# Patient Record
Sex: FEMALE | Race: WHITE | HISPANIC OR LATINO
[De-identification: names, ages, dates, MRNs, and addresses within clinical notes are randomized per-mention and may not be internally consistent; named-entity substitution may affect disease eponyms.]

---

## 2017-05-17 PROBLEM — Z00.00 ENCOUNTER FOR PREVENTIVE HEALTH EXAMINATION: Status: ACTIVE | Noted: 2017-05-17

## 2017-05-23 ENCOUNTER — APPOINTMENT (OUTPATIENT)
Dept: CARDIOLOGY | Facility: CLINIC | Age: 47
End: 2017-05-23

## 2017-05-23 ENCOUNTER — OUTPATIENT (OUTPATIENT)
Dept: OUTPATIENT SERVICES | Facility: HOSPITAL | Age: 47
LOS: 1 days | End: 2017-05-23
Payer: COMMERCIAL

## 2017-05-23 VITALS
RESPIRATION RATE: 16 BRPM | WEIGHT: 160 LBS | HEIGHT: 63 IN | BODY MASS INDEX: 28.35 KG/M2 | SYSTOLIC BLOOD PRESSURE: 127 MMHG | DIASTOLIC BLOOD PRESSURE: 84 MMHG | OXYGEN SATURATION: 98 % | HEART RATE: 68 BPM

## 2017-05-23 DIAGNOSIS — Z83.49 FAMILY HISTORY OF OTHER ENDOCRINE, NUTRITIONAL AND METABOLIC DISEASES: ICD-10-CM

## 2017-05-23 DIAGNOSIS — Z00.8 ENCOUNTER FOR OTHER GENERAL EXAMINATION: ICD-10-CM

## 2017-05-23 DIAGNOSIS — Z86.79 PERSONAL HISTORY OF OTHER DISEASES OF THE CIRCULATORY SYSTEM: ICD-10-CM

## 2017-05-23 DIAGNOSIS — Z83.3 FAMILY HISTORY OF DIABETES MELLITUS: ICD-10-CM

## 2017-05-23 PROCEDURE — G0463: CPT | Mod: 25

## 2017-05-23 PROCEDURE — 93005 ELECTROCARDIOGRAM TRACING: CPT

## 2017-05-31 DIAGNOSIS — R06.09 OTHER FORMS OF DYSPNEA: ICD-10-CM

## 2017-06-02 ENCOUNTER — APPOINTMENT (OUTPATIENT)
Dept: ULTRASOUND IMAGING | Facility: HOSPITAL | Age: 47
End: 2017-06-02

## 2017-06-08 ENCOUNTER — OUTPATIENT (OUTPATIENT)
Dept: OUTPATIENT SERVICES | Facility: HOSPITAL | Age: 47
LOS: 1 days | End: 2017-06-08
Payer: COMMERCIAL

## 2017-06-08 ENCOUNTER — APPOINTMENT (OUTPATIENT)
Dept: CARDIOLOGY | Facility: CLINIC | Age: 47
End: 2017-06-08

## 2017-06-08 DIAGNOSIS — Z00.8 ENCOUNTER FOR OTHER GENERAL EXAMINATION: ICD-10-CM

## 2017-06-08 PROCEDURE — 93306 TTE W/DOPPLER COMPLETE: CPT | Mod: 26

## 2017-06-08 PROCEDURE — 93306 TTE W/DOPPLER COMPLETE: CPT

## 2017-06-13 ENCOUNTER — APPOINTMENT (OUTPATIENT)
Dept: CARDIOLOGY | Facility: CLINIC | Age: 47
End: 2017-06-13

## 2017-06-13 ENCOUNTER — OUTPATIENT (OUTPATIENT)
Dept: OUTPATIENT SERVICES | Facility: HOSPITAL | Age: 47
LOS: 1 days | End: 2017-06-13
Payer: COMMERCIAL

## 2017-06-13 DIAGNOSIS — Z00.8 ENCOUNTER FOR OTHER GENERAL EXAMINATION: ICD-10-CM

## 2017-06-13 PROCEDURE — 93018 CV STRESS TEST I&R ONLY: CPT

## 2017-06-13 PROCEDURE — 93016 CV STRESS TEST SUPVJ ONLY: CPT

## 2017-06-13 PROCEDURE — 93017 CV STRESS TEST TRACING ONLY: CPT

## 2017-06-27 ENCOUNTER — OUTPATIENT (OUTPATIENT)
Dept: OUTPATIENT SERVICES | Facility: HOSPITAL | Age: 47
LOS: 1 days | End: 2017-06-27
Payer: COMMERCIAL

## 2017-06-27 PROCEDURE — 76856 US EXAM PELVIC COMPLETE: CPT

## 2017-06-27 PROCEDURE — 72100 X-RAY EXAM L-S SPINE 2/3 VWS: CPT | Mod: 26

## 2017-06-27 PROCEDURE — 76830 TRANSVAGINAL US NON-OB: CPT | Mod: 26

## 2017-06-27 PROCEDURE — 73502 X-RAY EXAM HIP UNI 2-3 VIEWS: CPT

## 2017-06-27 PROCEDURE — 76700 US EXAM ABDOM COMPLETE: CPT

## 2017-06-27 PROCEDURE — 76856 US EXAM PELVIC COMPLETE: CPT | Mod: 26

## 2017-06-27 PROCEDURE — 76700 US EXAM ABDOM COMPLETE: CPT | Mod: 26

## 2017-06-27 PROCEDURE — 76830 TRANSVAGINAL US NON-OB: CPT

## 2017-06-27 PROCEDURE — 72100 X-RAY EXAM L-S SPINE 2/3 VWS: CPT

## 2017-06-27 PROCEDURE — 73502 X-RAY EXAM HIP UNI 2-3 VIEWS: CPT | Mod: 26,RT

## 2017-07-11 ENCOUNTER — APPOINTMENT (OUTPATIENT)
Dept: CARDIOLOGY | Facility: CLINIC | Age: 47
End: 2017-07-11

## 2021-01-16 ENCOUNTER — EMERGENCY (EMERGENCY)
Facility: HOSPITAL | Age: 51
LOS: 1 days | Discharge: HOME | End: 2021-01-16
Admitting: EMERGENCY MEDICINE
Payer: COMMERCIAL

## 2021-01-16 VITALS
OXYGEN SATURATION: 99 % | DIASTOLIC BLOOD PRESSURE: 85 MMHG | SYSTOLIC BLOOD PRESSURE: 132 MMHG | HEART RATE: 76 BPM | RESPIRATION RATE: 17 BRPM | TEMPERATURE: 98 F

## 2021-01-16 DIAGNOSIS — M79.674 PAIN IN RIGHT TOE(S): ICD-10-CM

## 2021-01-16 DIAGNOSIS — L08.9 LOCAL INFECTION OF THE SKIN AND SUBCUTANEOUS TISSUE, UNSPECIFIED: ICD-10-CM

## 2021-01-16 DIAGNOSIS — E78.00 PURE HYPERCHOLESTEROLEMIA, UNSPECIFIED: ICD-10-CM

## 2021-01-16 DIAGNOSIS — I10 ESSENTIAL (PRIMARY) HYPERTENSION: ICD-10-CM

## 2021-01-16 DIAGNOSIS — L53.9 ERYTHEMATOUS CONDITION, UNSPECIFIED: ICD-10-CM

## 2021-01-16 PROCEDURE — 99283 EMERGENCY DEPT VISIT LOW MDM: CPT

## 2021-01-16 RX ORDER — CEPHALEXIN 500 MG
1 CAPSULE ORAL
Qty: 30 | Refills: 0
Start: 2021-01-16 | End: 2021-01-25

## 2021-01-16 RX ORDER — CEPHALEXIN 500 MG
500 CAPSULE ORAL ONCE
Refills: 0 | Status: COMPLETED | OUTPATIENT
Start: 2021-01-16 | End: 2021-01-16

## 2021-01-16 RX ORDER — BISOPROLOL FUMARATE 10 MG/1
1 TABLET, FILM COATED ORAL
Qty: 0 | Refills: 0 | DISCHARGE

## 2021-01-16 RX ADMIN — Medication 1 TABLET(S): at 21:31

## 2021-01-16 RX ADMIN — Medication 500 MILLIGRAM(S): at 21:31

## 2021-01-16 NOTE — ED PROVIDER NOTE - OBJECTIVE STATEMENT
51 y/o F with PMHx HTN presents to the ED c/o R foot 1st toe pain, swelling, and redness that started earlier today. Pt reports she had a pedicure done today and typically uses an acrylic nail on the 1st toe due to a prior injury that resulted in the pt being unable to grow a nail on the toe. States the pain and swelling started after the pedicure, and she decided to cut the acrylic nail off and cleaned the area with hydrogen peroxide. Denies any fever, trauma, purulent drainage to the area, or numbness or tingling. 51 y/o F with PMHx HTN presents to the ED c/o R foot 1st toe pain, swelling, and redness that started earlier today. Pt reports she had a pedicure done today and typically uses an acrylic nail on the 1st toe due to a prior injury that resulted in the pt  unable to grow a nail on the toe. States the pain and swelling started after the pedicure, and she decided to cut the acrylic nail off and cleaned the area with hydrogen peroxide. Denies any fever, trauma, purulent drainage to the area, or numbness or tingling.

## 2021-01-16 NOTE — ED PROVIDER NOTE - CLINICAL SUMMARY MEDICAL DECISION MAKING FREE TEXT BOX
Patient with right great toe cuticle infection. No collection with no lymphangitis. Afebrile and well appearing pt. Recommend daily wound care, abx therapy and f/u with podiatry.

## 2021-01-16 NOTE — ED PROVIDER NOTE - MUSCULOSKELETAL, MLM
Swollen R great toe, erythematous, no collection, distal pulses intact, no lymphangitis, FROM of the toe and extremity w/ no motor or sensory deficits.

## 2021-01-16 NOTE — ED PROVIDER NOTE - NSFOLLOWUPINSTRUCTIONS_ED_ALL_ED_FT
ACUTE WOUNDS - AfterCare(R) Instructions(ER/ED)           Acute Wounds    WHAT YOU NEED TO KNOW:    An acute wound is an injury that causes a break in the skin. As your wound begins to heal, it is normal to have some swelling, pain, and redness. Your body's immune system is working to keep your wound from getting infected. Your wound may develop a scab. The scab protects your wound as it heals.     DISCHARGE INSTRUCTIONS:    Call 911 for the following:   •You suddenly have trouble breathing or have chest pain.          Return to the emergency department if:   •Blood soaks through your bandage.      •You have pus or a foul odor coming from the wound.      •Your stitches come apart or your wound reopens.      Contact your healthcare provider if:   •You continue to have pain even after you have taken pain medicine.      •You have muscle, joint, or body aches, sweating, or a fever.      •You have increased swelling, redness, or bleeding in your wound.      •Your skin is itchy, swollen, or you have a rash.      •You have questions or concerns about your condition or care.      Medicines: You may need any of the following:   •Antibiotics may be given to prevent or treat an infection.       •Prescription pain medicine may be given. Ask your how to take this medicine safely.      •NSAIDs, such as ibuprofen, help decrease swelling, pain, and fever. This medicine is available with or without a doctor's order. NSAIDs can cause stomach bleeding or kidney problems in certain people. If you take blood thinner medicine, always ask if NSAIDs are safe for you. Always read the medicine label and follow directions. Do not give these medicines to children under 6 months of age without direction from your child's healthcare provider.      •Take your medicine as directed. Contact your healthcare provider if you think your medicine is not helping or if you have side effects. Tell him or her if you are allergic to any medicine. Keep a list of the medicines, vitamins, and herbs you take. Include the amounts, and when and why you take them. Bring the list or the pill bottles to follow-up visits. Carry your medicine list with you in case of an emergency.      Care for your wound as directed: Acute wounds can be in different locations and caused by different injuries. Follow your healthcare provider's instructions on caring for your type of wound. The following care items are for most wounds:  •Keep your wound covered with a clean and dry bandage. Change your bandage if it becomes wet or dirty. This will decrease the risk for infection in your wound. Follow your healthcare provider's instructions for changing your dressing.       •Do not soak in a tub or swim until your healthcare provider says it is okay. Your wound may open if you get it too wet. Dirt from the water can also get into your wound and cause an infection.      •Keep pets away from your wound. Pets carry germs that can cause a wound infection.       •Do not pick or scratch scabs. Let scabs fall off on their own. You may damage new skin that is forming under the scab. You may have a worse scar after the damage.      •Eat healthy foods and drink liquids as directed. Healthy foods give your body the nutrients it needs to heal your wound. Liquids prevent dehydration that can decrease the blood supply to your wound. Healthy foods include fruits, vegetables, grains (breads and cereals), dairy, and protein foods. Protein foods include meat, fish, nuts, and soy products. Protein, calories, vitamin C, and zinc help wounds heal. Ask your healthcare provider for more information about the foods you should eat to improve healing.       Follow up with your healthcare provider as directed: Write down your questions so you remember to ask them during your visits.        © Copyright [employer] 2021           back to top                          © Copyright [employer] 2021

## 2021-01-16 NOTE — ED PROVIDER NOTE - PATIENT PORTAL LINK FT
You can access the FollowMyHealth Patient Portal offered by [facility] by registering at the following website: [email or web address]. By joining [employer]’s FollowMyHealth portal, you will also be able to view your health information using other applications (apps) compatible with our system.

## 2021-01-16 NOTE — ED PROVIDER NOTE - NSFOLLOWUPCLINICS_GEN_ALL_ED_FT
[facility]  Podiatry  [address] [place] [address]  Phone: [phone]  Fax:   Follow Up Time:

## 2021-01-16 NOTE — ED ADULT NURSE NOTE - OBJECTIVE STATEMENT
50y Female reporting 1st right Toe pain since this morning. Redness and swelling noted to the area without drainage. Per pt "I had an acrylic nail placed at the salon and picked it off when it was hurting". Denies cp, fever, chills, sob, nor cp.

## 2021-02-17 NOTE — ED PROVIDER NOTE - TOBACCO USE
Additional Notes: Patient consent was obtained to proceed with the visit and recommended plan of care after discussion of all risks and benefits, including the risks of COVID-19 exposure.
Detail Level: Simple
Unknown if ever smoked

## 2023-07-16 ENCOUNTER — EMERGENCY (EMERGENCY)
Facility: HOSPITAL | Age: 53
LOS: 1 days | Discharge: HOME | End: 2023-07-16
Attending: STUDENT IN AN ORGANIZED HEALTH CARE EDUCATION/TRAINING PROGRAM | Admitting: STUDENT IN AN ORGANIZED HEALTH CARE EDUCATION/TRAINING PROGRAM
Payer: COMMERCIAL

## 2023-07-16 VITALS
HEART RATE: 75 BPM | SYSTOLIC BLOOD PRESSURE: 120 MMHG | HEIGHT: 62 IN | OXYGEN SATURATION: 98 % | TEMPERATURE: 99 F | WEIGHT: 156.97 LBS | RESPIRATION RATE: 20 BRPM | DIASTOLIC BLOOD PRESSURE: 78 MMHG

## 2023-07-16 VITALS
DIASTOLIC BLOOD PRESSURE: 75 MMHG | HEART RATE: 65 BPM | OXYGEN SATURATION: 97 % | RESPIRATION RATE: 20 BRPM | TEMPERATURE: 98 F | SYSTOLIC BLOOD PRESSURE: 106 MMHG

## 2023-07-16 DIAGNOSIS — Z20.822 CONTACT WITH AND (SUSPECTED) EXPOSURE TO COVID-19: ICD-10-CM

## 2023-07-16 DIAGNOSIS — B97.89 OTHER VIRAL AGENTS AS THE CAUSE OF DISEASES CLASSIFIED ELSEWHERE: ICD-10-CM

## 2023-07-16 DIAGNOSIS — J06.9 ACUTE UPPER RESPIRATORY INFECTION, UNSPECIFIED: ICD-10-CM

## 2023-07-16 DIAGNOSIS — B97.10 UNSPECIFIED ENTEROVIRUS AS THE CAUSE OF DISEASES CLASSIFIED ELSEWHERE: ICD-10-CM

## 2023-07-16 DIAGNOSIS — R05.1 ACUTE COUGH: ICD-10-CM

## 2023-07-16 DIAGNOSIS — Z91.018 ALLERGY TO OTHER FOODS: ICD-10-CM

## 2023-07-16 DIAGNOSIS — Z86.16 PERSONAL HISTORY OF COVID-19: ICD-10-CM

## 2023-07-16 DIAGNOSIS — Z91.013 ALLERGY TO SEAFOOD: ICD-10-CM

## 2023-07-16 DIAGNOSIS — Z88.8 ALLERGY STATUS TO OTHER DRUGS, MEDICAMENTS AND BIOLOGICAL SUBSTANCES: ICD-10-CM

## 2023-07-16 PROBLEM — E78.00 PURE HYPERCHOLESTEROLEMIA, UNSPECIFIED: Chronic | Status: ACTIVE | Noted: 2021-01-16

## 2023-07-16 PROBLEM — I10 ESSENTIAL (PRIMARY) HYPERTENSION: Chronic | Status: ACTIVE | Noted: 2021-01-16

## 2023-07-16 PROBLEM — G43.909 MIGRAINE, UNSPECIFIED, NOT INTRACTABLE, WITHOUT STATUS MIGRAINOSUS: Chronic | Status: ACTIVE | Noted: 2021-01-16

## 2023-07-16 LAB
ALBUMIN SERPL ELPH-MCNC: 3.7 G/DL — SIGNIFICANT CHANGE UP (ref 3.3–5)
ALP SERPL-CCNC: 83 U/L — SIGNIFICANT CHANGE UP (ref 40–120)
ALT FLD-CCNC: 15 U/L — SIGNIFICANT CHANGE UP (ref 10–45)
ANION GAP SERPL CALC-SCNC: 9 MMOL/L — SIGNIFICANT CHANGE UP (ref 5–17)
AST SERPL-CCNC: 14 U/L — SIGNIFICANT CHANGE UP (ref 10–40)
BASOPHILS # BLD AUTO: 0.04 K/UL — SIGNIFICANT CHANGE UP (ref 0–0.2)
BASOPHILS NFR BLD AUTO: 0.7 % — SIGNIFICANT CHANGE UP (ref 0–2)
BILIRUB SERPL-MCNC: 0.3 MG/DL — SIGNIFICANT CHANGE UP (ref 0.2–1.2)
BUN SERPL-MCNC: 14 MG/DL — SIGNIFICANT CHANGE UP (ref 7–23)
CALCIUM SERPL-MCNC: 9.1 MG/DL — SIGNIFICANT CHANGE UP (ref 8.4–10.5)
CHLORIDE SERPL-SCNC: 113 MMOL/L — HIGH (ref 96–108)
CO2 SERPL-SCNC: 21 MMOL/L — LOW (ref 22–31)
CREAT SERPL-MCNC: 0.76 MG/DL — SIGNIFICANT CHANGE UP (ref 0.5–1.3)
EGFR: 93 ML/MIN/1.73M2 — SIGNIFICANT CHANGE UP
EOSINOPHIL # BLD AUTO: 0.08 K/UL — SIGNIFICANT CHANGE UP (ref 0–0.5)
EOSINOPHIL NFR BLD AUTO: 1.4 % — SIGNIFICANT CHANGE UP (ref 0–6)
GLUCOSE SERPL-MCNC: 95 MG/DL — SIGNIFICANT CHANGE UP (ref 70–99)
HCT VFR BLD CALC: 41.8 % — SIGNIFICANT CHANGE UP (ref 34.5–45)
HGB BLD-MCNC: 13.8 G/DL — SIGNIFICANT CHANGE UP (ref 11.5–15.5)
IMM GRANULOCYTES NFR BLD AUTO: 0.5 % — SIGNIFICANT CHANGE UP (ref 0–0.9)
LYMPHOCYTES # BLD AUTO: 1.08 K/UL — SIGNIFICANT CHANGE UP (ref 1–3.3)
LYMPHOCYTES # BLD AUTO: 18.9 % — SIGNIFICANT CHANGE UP (ref 13–44)
MCHC RBC-ENTMCNC: 29.4 PG — SIGNIFICANT CHANGE UP (ref 27–34)
MCHC RBC-ENTMCNC: 33 GM/DL — SIGNIFICANT CHANGE UP (ref 32–36)
MCV RBC AUTO: 88.9 FL — SIGNIFICANT CHANGE UP (ref 80–100)
MONOCYTES # BLD AUTO: 0.64 K/UL — SIGNIFICANT CHANGE UP (ref 0–0.9)
MONOCYTES NFR BLD AUTO: 11.2 % — SIGNIFICANT CHANGE UP (ref 2–14)
NEUTROPHILS # BLD AUTO: 3.85 K/UL — SIGNIFICANT CHANGE UP (ref 1.8–7.4)
NEUTROPHILS NFR BLD AUTO: 67.3 % — SIGNIFICANT CHANGE UP (ref 43–77)
NRBC # BLD: 0 /100 WBCS — SIGNIFICANT CHANGE UP (ref 0–0)
PLATELET # BLD AUTO: 277 K/UL — SIGNIFICANT CHANGE UP (ref 150–400)
POTASSIUM SERPL-MCNC: 4.6 MMOL/L — SIGNIFICANT CHANGE UP (ref 3.5–5.3)
POTASSIUM SERPL-SCNC: 4.6 MMOL/L — SIGNIFICANT CHANGE UP (ref 3.5–5.3)
PROT SERPL-MCNC: 7.3 G/DL — SIGNIFICANT CHANGE UP (ref 6–8.3)
RAPID RVP RESULT: DETECTED
RBC # BLD: 4.7 M/UL — SIGNIFICANT CHANGE UP (ref 3.8–5.2)
RBC # FLD: 13.8 % — SIGNIFICANT CHANGE UP (ref 10.3–14.5)
RV+EV RNA SPEC QL NAA+PROBE: DETECTED
SARS-COV-2 RNA SPEC QL NAA+PROBE: SIGNIFICANT CHANGE UP
SODIUM SERPL-SCNC: 143 MMOL/L — SIGNIFICANT CHANGE UP (ref 135–145)
WBC # BLD: 5.72 K/UL — SIGNIFICANT CHANGE UP (ref 3.8–10.5)
WBC # FLD AUTO: 5.72 K/UL — SIGNIFICANT CHANGE UP (ref 3.8–10.5)

## 2023-07-16 PROCEDURE — 85025 COMPLETE CBC W/AUTO DIFF WBC: CPT

## 2023-07-16 PROCEDURE — 0225U NFCT DS DNA&RNA 21 SARSCOV2: CPT

## 2023-07-16 PROCEDURE — 93005 ELECTROCARDIOGRAM TRACING: CPT

## 2023-07-16 PROCEDURE — 94640 AIRWAY INHALATION TREATMENT: CPT

## 2023-07-16 PROCEDURE — 99284 EMERGENCY DEPT VISIT MOD MDM: CPT

## 2023-07-16 PROCEDURE — 36415 COLL VENOUS BLD VENIPUNCTURE: CPT

## 2023-07-16 PROCEDURE — 99285 EMERGENCY DEPT VISIT HI MDM: CPT | Mod: 25

## 2023-07-16 PROCEDURE — 80053 COMPREHEN METABOLIC PANEL: CPT

## 2023-07-16 PROCEDURE — 71045 X-RAY EXAM CHEST 1 VIEW: CPT | Mod: 26

## 2023-07-16 PROCEDURE — 71045 X-RAY EXAM CHEST 1 VIEW: CPT

## 2023-07-16 RX ORDER — AMOXICILLIN 250 MG/5ML
2 SUSPENSION, RECONSTITUTED, ORAL (ML) ORAL
Qty: 42 | Refills: 0
Start: 2023-07-16 | End: 2023-07-22

## 2023-07-16 RX ORDER — AZITHROMYCIN 500 MG/1
1 TABLET, FILM COATED ORAL
Qty: 4 | Refills: 0
Start: 2023-07-16 | End: 2023-07-19

## 2023-07-16 RX ORDER — IPRATROPIUM/ALBUTEROL SULFATE 18-103MCG
3 AEROSOL WITH ADAPTER (GRAM) INHALATION ONCE
Refills: 0 | Status: COMPLETED | OUTPATIENT
Start: 2023-07-16 | End: 2023-07-16

## 2023-07-16 RX ORDER — AMOXICILLIN 250 MG/5ML
1000 SUSPENSION, RECONSTITUTED, ORAL (ML) ORAL ONCE
Refills: 0 | Status: COMPLETED | OUTPATIENT
Start: 2023-07-16 | End: 2023-07-16

## 2023-07-16 RX ORDER — AZITHROMYCIN 500 MG/1
500 TABLET, FILM COATED ORAL ONCE
Refills: 0 | Status: COMPLETED | OUTPATIENT
Start: 2023-07-16 | End: 2023-07-16

## 2023-07-16 RX ADMIN — AZITHROMYCIN 500 MILLIGRAM(S): 500 TABLET, FILM COATED ORAL at 12:56

## 2023-07-16 RX ADMIN — Medication 3 MILLILITER(S): at 13:40

## 2023-07-16 RX ADMIN — Medication 1000 MILLIGRAM(S): at 12:56

## 2023-07-16 NOTE — ED PROVIDER NOTE - CLINICAL SUMMARY MEDICAL DECISION MAKING FREE TEXT BOX
53-year-old female presenting with cough, congestion.  Patient well-appearing, low-grade fever here.  Suspect likely viral illness.  However, cough more productive in nature with green sputum, will obtain chest x-ray for rule out pneumonia.  Patient also with chronic chest pain, recent negative cardiology work-up, EKG nonischemic, do not suspect ACS.  No wheezing on exam, no respiratory distress.  No other PE risk factors.  Will obtain basic labs, neb treatment

## 2023-07-16 NOTE — ED PROVIDER NOTE - OBJECTIVE STATEMENT
53-year-old female with history of "post-COVID inflammation", followed by pulmonologist, presenting with 3 days of cough, initially dry and now productive of green sputum with associated shortness of breath.  Some low-grade fever and chills.  Also some rhinorrhea, no sore throat.  No lightheadedness or dizziness, no recent travel, history of DVT or PE, no leg pain or leg swelling.  Patient also with many months of midsternal chest pain, nonexertional nonpleuritic, saw cardiologist in June, had a negative work-up with a 0 calcium score.  ROS as above.

## 2023-07-16 NOTE — ED ADULT NURSE NOTE - NSFALLUNIVINTERV_ED_ALL_ED
Bed/Stretcher in lowest position, wheels locked, appropriate side rails in place/Call bell, personal items and telephone in reach/Instruct patient to call for assistance before getting out of bed/chair/stretcher/Non-slip footwear applied when patient is off stretcher/[place] to call system/Physically safe environment - no spills, clutter or unnecessary equipment/Purposeful proactive rounding/Room/bathroom lighting operational, light cord in reach

## 2023-07-16 NOTE — ED PROVIDER NOTE - PHYSICAL EXAMINATION
general: Well appearing, in no acute distress  HEENT: Normocephalic, atraumatic, extraocular movements intact  CV: Regular rate  Pulm: No respiratory distress, no tachypnea, CTAB, no w/r/r  Abd: Flat, no gross distension  Ext: warm and well perfused, no le edema  Skin: No gross rashes or lesions  Neuro: Alert and oriented, moving all extremities

## 2023-07-16 NOTE — ED ADULT TRIAGE NOTE - CHIEF COMPLAINT QUOTE
Pt to ED c/o SOB, chest pain, dry couch, and upper back pain worse x1 week. Per pt "I have been having lung problems for a year. I see a pulmonologist and have lung inflammation". Pt has been using Inhaler x2 times a day.

## 2023-07-16 NOTE — ED ADULT NURSE NOTE - NSFALLRISK_ED_ALL_ED
Silvia Cage RN    No

## 2023-07-16 NOTE — ED ADULT NURSE NOTE - OBJECTIVE STATEMENT
53 year old F patient with c/o of SOB, chronic, but sob increased over the last week with a productive cough.  Pt states sob and inflammatory lung disease since having covid 1 year ago.  Lung nodule found, biopsy set for saturday.  No distress noted.  A+OX3, speaking full clear sentences without distress.

## 2023-07-16 NOTE — ED PROVIDER NOTE - NSFOLLOWUPINSTRUCTIONS_ED_ALL_ED_FT
Please take antibiotics as prescribed.  Please return for worsening symptoms, shortness of breath, fever, chest pain.  Please follow-up with your primary doctor and your pulmonologist.    I hope you feel better soon!    Sincerely,  Susie Benavides MD

## 2023-07-16 NOTE — ED PROVIDER NOTE - PATIENT PORTAL LINK FT
You can access the FollowMyHealth Patient Portal offered by [facility] by registering at the following website: [email or web address]. By joining [employer]’s FollowMyHealth portal, you will also be able to view your health information using other applications (apps) compatible with our system.

## 2023-07-16 NOTE — ED PROVIDER NOTE - NS ED ROS FT
Constitutional: No fever or chills  Eyes: No discharge or drainage  Ears, Nose, Mouth, Throat: +nasal discharge, no sore throat  Cardiovascular: +chest pain, no palpitations  Respiratory: +shortness of breath, +cough  Gastrointestinal: No nausea or vomiting, no abdominal pain, no diarrhea or constipation  Musculoskeletal: No joint pain, no swelling  Skin: No rashes or lesions  Neurological: No numbness, weakness, tingling, no headache

## 2023-11-09 NOTE — ED PROVIDER NOTE - NS_EDPROVIDERDISPOUSERTYPE_ED_A_ED
Attending Attestation (For Attendings USE Only)... Gen: Well appearing in NAD.   Head: atraumatic  Msk: +left patella dislocated to the lateral side of the knee with TTP and swelling.  Limited ROM of knee 2/2 pain.  2+ pedal pulses. No neurovasc compromise   Neuro: AAO x3, patient moving all extremity equally, no focal neuro deficits noted  Skin: Normal for race. No bruising or ecchymosis   Psych: Alert and oriented

## 2024-05-05 ENCOUNTER — INPATIENT (INPATIENT)
Facility: HOSPITAL | Age: 54
LOS: 1 days | Discharge: HOME | DRG: 195 | End: 2024-05-07
Attending: INTERNAL MEDICINE | Admitting: INTERNAL MEDICINE
Payer: COMMERCIAL

## 2024-05-05 VITALS
WEIGHT: 164.91 LBS | RESPIRATION RATE: 20 BRPM | HEIGHT: 63 IN | OXYGEN SATURATION: 97 % | TEMPERATURE: 100 F | SYSTOLIC BLOOD PRESSURE: 126 MMHG | HEART RATE: 114 BPM | DIASTOLIC BLOOD PRESSURE: 77 MMHG

## 2024-05-05 DIAGNOSIS — J18.9 PNEUMONIA, UNSPECIFIED ORGANISM: ICD-10-CM

## 2024-05-05 LAB
ALBUMIN SERPL ELPH-MCNC: 4.2 G/DL — SIGNIFICANT CHANGE UP (ref 3.3–5)
ALP SERPL-CCNC: 86 U/L — SIGNIFICANT CHANGE UP (ref 40–120)
ALT FLD-CCNC: 26 U/L — SIGNIFICANT CHANGE UP (ref 10–45)
ANION GAP SERPL CALC-SCNC: 11 MMOL/L — SIGNIFICANT CHANGE UP (ref 5–17)
APPEARANCE UR: ABNORMAL
APTT BLD: 30.3 SEC — SIGNIFICANT CHANGE UP (ref 24.5–35.6)
AST SERPL-CCNC: 23 U/L — SIGNIFICANT CHANGE UP (ref 10–40)
BACTERIA # UR AUTO: NEGATIVE /HPF — SIGNIFICANT CHANGE UP
BASE EXCESS BLDV CALC-SCNC: -2.7 MMOL/L — LOW (ref -2–3)
BASOPHILS # BLD AUTO: 0.04 K/UL — SIGNIFICANT CHANGE UP (ref 0–0.2)
BASOPHILS NFR BLD AUTO: 0.5 % — SIGNIFICANT CHANGE UP (ref 0–2)
BILIRUB SERPL-MCNC: 0.5 MG/DL — SIGNIFICANT CHANGE UP (ref 0.2–1.2)
BILIRUB UR-MCNC: NEGATIVE — SIGNIFICANT CHANGE UP
BUN SERPL-MCNC: 8 MG/DL — SIGNIFICANT CHANGE UP (ref 7–23)
CA-I SERPL-SCNC: 1.26 MMOL/L — SIGNIFICANT CHANGE UP (ref 1.15–1.33)
CALCIUM SERPL-MCNC: 9.7 MG/DL — SIGNIFICANT CHANGE UP (ref 8.4–10.5)
CAST: 0 /LPF — SIGNIFICANT CHANGE UP (ref 0–4)
CHLORIDE BLDV-SCNC: 105 MMOL/L — SIGNIFICANT CHANGE UP (ref 96–108)
CHLORIDE SERPL-SCNC: 107 MMOL/L — SIGNIFICANT CHANGE UP (ref 96–108)
CO2 BLDV-SCNC: 24 MMOL/L — SIGNIFICANT CHANGE UP (ref 22–26)
CO2 SERPL-SCNC: 21 MMOL/L — LOW (ref 22–31)
COLOR SPEC: YELLOW — SIGNIFICANT CHANGE UP
CREAT SERPL-MCNC: 0.85 MG/DL — SIGNIFICANT CHANGE UP (ref 0.5–1.3)
DIFF PNL FLD: NEGATIVE — SIGNIFICANT CHANGE UP
EGFR: 81 ML/MIN/1.73M2 — SIGNIFICANT CHANGE UP
EOSINOPHIL # BLD AUTO: 0.01 K/UL — SIGNIFICANT CHANGE UP (ref 0–0.5)
EOSINOPHIL NFR BLD AUTO: 0.1 % — SIGNIFICANT CHANGE UP (ref 0–6)
FLUAV AG NPH QL: SIGNIFICANT CHANGE UP
FLUBV AG NPH QL: SIGNIFICANT CHANGE UP
GAS PNL BLDV: 136 MMOL/L — SIGNIFICANT CHANGE UP (ref 136–145)
GAS PNL BLDV: SIGNIFICANT CHANGE UP
GAS PNL BLDV: SIGNIFICANT CHANGE UP
GLUCOSE BLDV-MCNC: 99 MG/DL — SIGNIFICANT CHANGE UP (ref 70–99)
GLUCOSE SERPL-MCNC: 109 MG/DL — HIGH (ref 70–99)
GLUCOSE UR QL: NEGATIVE MG/DL — SIGNIFICANT CHANGE UP
HCG SERPL-ACNC: <2 MIU/ML — SIGNIFICANT CHANGE UP
HCO3 BLDV-SCNC: 23 MMOL/L — SIGNIFICANT CHANGE UP (ref 22–29)
HCT VFR BLD CALC: 41.9 % — SIGNIFICANT CHANGE UP (ref 34.5–45)
HCT VFR BLDA CALC: 43 % — SIGNIFICANT CHANGE UP (ref 34.5–46.5)
HGB BLD CALC-MCNC: 14.2 G/DL — SIGNIFICANT CHANGE UP (ref 11.7–16.1)
HGB BLD-MCNC: 14 G/DL — SIGNIFICANT CHANGE UP (ref 11.5–15.5)
IMM GRANULOCYTES NFR BLD AUTO: 0.4 % — SIGNIFICANT CHANGE UP (ref 0–0.9)
INR BLD: 1.1 RATIO — SIGNIFICANT CHANGE UP (ref 0.85–1.18)
KETONES UR-MCNC: NEGATIVE MG/DL — SIGNIFICANT CHANGE UP
LACTATE BLDV-MCNC: 0.8 MMOL/L — SIGNIFICANT CHANGE UP (ref 0.5–2)
LEUKOCYTE ESTERASE UR-ACNC: NEGATIVE — SIGNIFICANT CHANGE UP
LYMPHOCYTES # BLD AUTO: 0.63 K/UL — LOW (ref 1–3.3)
LYMPHOCYTES # BLD AUTO: 7.9 % — LOW (ref 13–44)
MCHC RBC-ENTMCNC: 29.4 PG — SIGNIFICANT CHANGE UP (ref 27–34)
MCHC RBC-ENTMCNC: 33.4 GM/DL — SIGNIFICANT CHANGE UP (ref 32–36)
MCV RBC AUTO: 88 FL — SIGNIFICANT CHANGE UP (ref 80–100)
MONOCYTES # BLD AUTO: 0.93 K/UL — HIGH (ref 0–0.9)
MONOCYTES NFR BLD AUTO: 11.7 % — SIGNIFICANT CHANGE UP (ref 2–14)
NEUTROPHILS # BLD AUTO: 6.31 K/UL — SIGNIFICANT CHANGE UP (ref 1.8–7.4)
NEUTROPHILS NFR BLD AUTO: 79.4 % — HIGH (ref 43–77)
NITRITE UR-MCNC: NEGATIVE — SIGNIFICANT CHANGE UP
NRBC # BLD: 0 /100 WBCS — SIGNIFICANT CHANGE UP (ref 0–0)
NT-PROBNP SERPL-SCNC: <36 PG/ML — SIGNIFICANT CHANGE UP (ref 0–300)
PCO2 BLDV: 40 MMHG — SIGNIFICANT CHANGE UP (ref 39–42)
PH BLDV: 7.36 — SIGNIFICANT CHANGE UP (ref 7.32–7.43)
PH UR: 8 — SIGNIFICANT CHANGE UP (ref 5–8)
PLATELET # BLD AUTO: 230 K/UL — SIGNIFICANT CHANGE UP (ref 150–400)
PO2 BLDV: 26 MMHG — SIGNIFICANT CHANGE UP (ref 25–45)
POTASSIUM BLDV-SCNC: 3.9 MMOL/L — SIGNIFICANT CHANGE UP (ref 3.5–5.1)
POTASSIUM SERPL-MCNC: 4.1 MMOL/L — SIGNIFICANT CHANGE UP (ref 3.5–5.3)
POTASSIUM SERPL-SCNC: 4.1 MMOL/L — SIGNIFICANT CHANGE UP (ref 3.5–5.3)
PROT SERPL-MCNC: 8.1 G/DL — SIGNIFICANT CHANGE UP (ref 6–8.3)
PROT UR-MCNC: 30 MG/DL
PROTHROM AB SERPL-ACNC: 12.1 SEC — SIGNIFICANT CHANGE UP (ref 9.5–13)
RBC # BLD: 4.76 M/UL — SIGNIFICANT CHANGE UP (ref 3.8–5.2)
RBC # FLD: 13.4 % — SIGNIFICANT CHANGE UP (ref 10.3–14.5)
RBC CASTS # UR COMP ASSIST: 1 /HPF — SIGNIFICANT CHANGE UP (ref 0–4)
RSV RNA NPH QL NAA+NON-PROBE: SIGNIFICANT CHANGE UP
SAO2 % BLDV: 43.8 % — LOW (ref 67–88)
SARS-COV-2 RNA SPEC QL NAA+PROBE: SIGNIFICANT CHANGE UP
SODIUM SERPL-SCNC: 139 MMOL/L — SIGNIFICANT CHANGE UP (ref 135–145)
SP GR SPEC: 1.02 — SIGNIFICANT CHANGE UP (ref 1–1.03)
SQUAMOUS # UR AUTO: 1 /HPF — SIGNIFICANT CHANGE UP (ref 0–5)
TROPONIN T, HIGH SENSITIVITY RESULT: <6 NG/L — SIGNIFICANT CHANGE UP (ref 0–51)
UROBILINOGEN FLD QL: 1 MG/DL — SIGNIFICANT CHANGE UP (ref 0.2–1)
WBC # BLD: 7.95 K/UL — SIGNIFICANT CHANGE UP (ref 3.8–10.5)
WBC # FLD AUTO: 7.95 K/UL — SIGNIFICANT CHANGE UP (ref 3.8–10.5)
WBC UR QL: 0 /HPF — SIGNIFICANT CHANGE UP (ref 0–5)

## 2024-05-05 PROCEDURE — 71275 CT ANGIOGRAPHY CHEST: CPT | Mod: 26,MC

## 2024-05-05 PROCEDURE — 99285 EMERGENCY DEPT VISIT HI MDM: CPT

## 2024-05-05 RX ORDER — BUDESONIDE AND FORMOTEROL FUMARATE DIHYDRATE 160; 4.5 UG/1; UG/1
2 AEROSOL RESPIRATORY (INHALATION)
Refills: 0 | Status: DISCONTINUED | OUTPATIENT
Start: 2024-05-05 | End: 2024-05-07

## 2024-05-05 RX ORDER — AZITHROMYCIN 500 MG/1
500 TABLET, FILM COATED ORAL ONCE
Refills: 0 | Status: COMPLETED | OUTPATIENT
Start: 2024-05-05 | End: 2024-05-05

## 2024-05-05 RX ORDER — CEFTRIAXONE 500 MG/1
1000 INJECTION, POWDER, FOR SOLUTION INTRAMUSCULAR; INTRAVENOUS EVERY 24 HOURS
Refills: 0 | Status: DISCONTINUED | OUTPATIENT
Start: 2024-05-05 | End: 2024-05-07

## 2024-05-05 RX ORDER — ACETAMINOPHEN 500 MG
650 TABLET ORAL EVERY 6 HOURS
Refills: 0 | Status: DISCONTINUED | OUTPATIENT
Start: 2024-05-05 | End: 2024-05-07

## 2024-05-05 RX ORDER — SERTRALINE 25 MG/1
75 TABLET, FILM COATED ORAL DAILY
Refills: 0 | Status: DISCONTINUED | OUTPATIENT
Start: 2024-05-05 | End: 2024-05-07

## 2024-05-05 RX ORDER — CEFTRIAXONE 500 MG/1
1000 INJECTION, POWDER, FOR SOLUTION INTRAMUSCULAR; INTRAVENOUS ONCE
Refills: 0 | Status: COMPLETED | OUTPATIENT
Start: 2024-05-05 | End: 2024-05-05

## 2024-05-05 RX ORDER — IPRATROPIUM/ALBUTEROL SULFATE 18-103MCG
3 AEROSOL WITH ADAPTER (GRAM) INHALATION ONCE
Refills: 0 | Status: COMPLETED | OUTPATIENT
Start: 2024-05-05 | End: 2024-05-05

## 2024-05-05 RX ORDER — LIDOCAINE 4 G/100G
1 CREAM TOPICAL ONCE
Refills: 0 | Status: COMPLETED | OUTPATIENT
Start: 2024-05-05 | End: 2024-05-05

## 2024-05-05 RX ORDER — FAMOTIDINE 10 MG/ML
20 INJECTION INTRAVENOUS ONCE
Refills: 0 | Status: COMPLETED | OUTPATIENT
Start: 2024-05-05 | End: 2024-05-05

## 2024-05-05 RX ORDER — SODIUM CHLORIDE 9 MG/ML
1000 INJECTION INTRAMUSCULAR; INTRAVENOUS; SUBCUTANEOUS ONCE
Refills: 0 | Status: COMPLETED | OUTPATIENT
Start: 2024-05-05 | End: 2024-05-05

## 2024-05-05 RX ORDER — KETOROLAC TROMETHAMINE 30 MG/ML
15 SYRINGE (ML) INJECTION EVERY 6 HOURS
Refills: 0 | Status: DISCONTINUED | OUTPATIENT
Start: 2024-05-05 | End: 2024-05-07

## 2024-05-05 RX ORDER — SODIUM CHLORIDE 9 MG/ML
1000 INJECTION INTRAMUSCULAR; INTRAVENOUS; SUBCUTANEOUS
Refills: 0 | Status: DISCONTINUED | OUTPATIENT
Start: 2024-05-05 | End: 2024-05-07

## 2024-05-05 RX ORDER — FAMOTIDINE 10 MG/ML
20 INJECTION INTRAVENOUS
Refills: 0 | Status: DISCONTINUED | OUTPATIENT
Start: 2024-05-05 | End: 2024-05-07

## 2024-05-05 RX ORDER — ALBUTEROL 90 UG/1
2 AEROSOL, METERED ORAL EVERY 6 HOURS
Refills: 0 | Status: DISCONTINUED | OUTPATIENT
Start: 2024-05-05 | End: 2024-05-07

## 2024-05-05 RX ORDER — AMLODIPINE BESYLATE 2.5 MG/1
5 TABLET ORAL DAILY
Refills: 0 | Status: DISCONTINUED | OUTPATIENT
Start: 2024-05-05 | End: 2024-05-05

## 2024-05-05 RX ORDER — ACETAMINOPHEN 500 MG
1000 TABLET ORAL ONCE
Refills: 0 | Status: COMPLETED | OUTPATIENT
Start: 2024-05-05 | End: 2024-05-05

## 2024-05-05 RX ORDER — TOPIRAMATE 25 MG
100 TABLET ORAL DAILY
Refills: 0 | Status: DISCONTINUED | OUTPATIENT
Start: 2024-05-05 | End: 2024-05-07

## 2024-05-05 RX ORDER — ATORVASTATIN CALCIUM 80 MG/1
10 TABLET, FILM COATED ORAL AT BEDTIME
Refills: 0 | Status: DISCONTINUED | OUTPATIENT
Start: 2024-05-05 | End: 2024-05-07

## 2024-05-05 RX ORDER — AZITHROMYCIN 500 MG/1
500 TABLET, FILM COATED ORAL EVERY 24 HOURS
Refills: 0 | Status: DISCONTINUED | OUTPATIENT
Start: 2024-05-05 | End: 2024-05-07

## 2024-05-05 RX ADMIN — CEFTRIAXONE 100 MILLIGRAM(S): 500 INJECTION, POWDER, FOR SOLUTION INTRAMUSCULAR; INTRAVENOUS at 16:52

## 2024-05-05 RX ADMIN — ATORVASTATIN CALCIUM 10 MILLIGRAM(S): 80 TABLET, FILM COATED ORAL at 22:53

## 2024-05-05 RX ADMIN — Medication 3 MILLILITER(S): at 09:40

## 2024-05-05 RX ADMIN — ALBUTEROL 2 PUFF(S): 90 AEROSOL, METERED ORAL at 16:57

## 2024-05-05 RX ADMIN — CEFTRIAXONE 100 MILLIGRAM(S): 500 INJECTION, POWDER, FOR SOLUTION INTRAMUSCULAR; INTRAVENOUS at 10:31

## 2024-05-05 RX ADMIN — SERTRALINE 75 MILLIGRAM(S): 25 TABLET, FILM COATED ORAL at 22:52

## 2024-05-05 RX ADMIN — Medication 100 MILLIGRAM(S): at 22:52

## 2024-05-05 RX ADMIN — Medication 600 MILLIGRAM(S): at 16:56

## 2024-05-05 RX ADMIN — Medication 650 MILLIGRAM(S): at 22:52

## 2024-05-05 RX ADMIN — Medication 15 MILLIGRAM(S): at 17:49

## 2024-05-05 RX ADMIN — FAMOTIDINE 20 MILLIGRAM(S): 10 INJECTION INTRAVENOUS at 22:52

## 2024-05-05 RX ADMIN — Medication 15 MILLIGRAM(S): at 16:52

## 2024-05-05 RX ADMIN — Medication 100 MILLIGRAM(S): at 09:40

## 2024-05-05 RX ADMIN — SODIUM CHLORIDE 100 MILLILITER(S): 9 INJECTION INTRAMUSCULAR; INTRAVENOUS; SUBCUTANEOUS at 16:52

## 2024-05-05 RX ADMIN — AZITHROMYCIN 255 MILLIGRAM(S): 500 TABLET, FILM COATED ORAL at 11:33

## 2024-05-05 RX ADMIN — AZITHROMYCIN 255 MILLIGRAM(S): 500 TABLET, FILM COATED ORAL at 16:52

## 2024-05-05 RX ADMIN — SODIUM CHLORIDE 1000 MILLILITER(S): 9 INJECTION INTRAMUSCULAR; INTRAVENOUS; SUBCUTANEOUS at 10:31

## 2024-05-05 RX ADMIN — FAMOTIDINE 20 MILLIGRAM(S): 10 INJECTION INTRAVENOUS at 16:55

## 2024-05-05 RX ADMIN — Medication 400 MILLIGRAM(S): at 09:40

## 2024-05-05 RX ADMIN — BUDESONIDE AND FORMOTEROL FUMARATE DIHYDRATE 2 PUFF(S): 160; 4.5 AEROSOL RESPIRATORY (INHALATION) at 16:56

## 2024-05-05 RX ADMIN — SODIUM CHLORIDE 1000 MILLILITER(S): 9 INJECTION INTRAMUSCULAR; INTRAVENOUS; SUBCUTANEOUS at 09:39

## 2024-05-05 RX ADMIN — Medication 75 MILLIGRAM(S): at 16:54

## 2024-05-05 RX ADMIN — LIDOCAINE 1 PATCH: 4 CREAM TOPICAL at 09:39

## 2024-05-05 NOTE — H&P ADULT - NSHPLABSRESULTS_GEN_ALL_CORE
LABS:                        14.0   7.95  )-----------( 230      ( 05 May 2024 09:36 )             41.9         139  |  107  |  8   ----------------------------<  109<H>  4.1   |  21<L>  |  0.85    Ca    9.7      05 May 2024 09:36    TPro  8.1  /  Alb  4.2  /  TBili  0.5  /  DBili  x   /  AST  23  /  ALT  26  /  AlkPhos  86      PT/INR - ( 05 May 2024 09:36 )   PT: 12.1 sec;   INR: 1.10 ratio         PTT - ( 05 May 2024 09:36 )  PTT:30.3 sec  Urinalysis Basic - ( 05 May 2024 09:51 )    Color: Yellow / Appearance: Cloudy / S.019 / pH: x  Gluc: x / Ketone: Negative mg/dL  / Bili: Negative / Urobili: 1.0 mg/dL   Blood: x / Protein: 30 mg/dL / Nitrite: Negative   Leuk Esterase: Negative / RBC: 1 /HPF / WBC 0 /HPF   Sq Epi: x / Non Sq Epi: 1 /HPF / Bacteria: Negative /HPF          RADIOLOGY & ADDITIONAL TESTS:

## 2024-05-05 NOTE — PATIENT PROFILE ADULT - FALL HARM RISK - UNIVERSAL INTERVENTIONS
Bed in lowest position, wheels locked, appropriate side rails in place/Call bell, personal items and telephone in reach/Instruct patient to call for assistance before getting out of bed or chair/Non-slip footwear when patient is out of bed/[place] to call system/Physically safe environment - no spills, clutter or unnecessary equipment/Purposeful Proactive Rounding/Room/bathroom lighting operational, light cord in reach

## 2024-05-05 NOTE — ED ADULT NURSE NOTE - CAS TRG GEN SKIN COLOR
Patient presents for her routine OB visit.    Patient would like communication of their results via:        Cell Phone:   Telephone Information:   Mobile [phone]     Okay to leave a message containing results? Yes    Patient's current [facility] status: Active.      Normal for race

## 2024-05-05 NOTE — ED ADULT NURSE NOTE - OBJECTIVE STATEMENT
53 yo presents to the ED from home. A&Ox4, ambulatory c/o hemoptysis, persistent fever, SOB, and body aches x 5 days. pt was diagnosed with Flu 5/3. Patient last took anti-pyretics at 2am this morning. Patient reported her sputum is blood tinged and unable to sleep due to the cough. Patient feels dyspneic with speaking. Has had diarrhea since onset of symptoms up to five times per day. Patient reports chronic chest pain related to the chest wall mass. Patient denies syncope, palpitations, lightheadedness, abdominal pain, nausea, vomiting, urinary symptoms. 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 18G inserted LAC. EKG done at bedside. pt placed on CM. Patient undressed and placed into gown, call bell in hand and side rails up for safety. warm blanket provided, vital signs stable, pt in no acute distress.

## 2024-05-05 NOTE — ED ADULT TRIAGE NOTE - PATIENT ON (OXYGEN DELIVERY METHOD)
room air
pt BIBA from [facility]ospice [facility] s/p fall 15 hours ago, left eye ecchymotic and edematous. per EMS, Hospice center informed them pt is not at baseline mental status. pt nonverbal, not responding to verbal stimuli. priority CT called, pt transported tp CT.

## 2024-05-05 NOTE — ED PROVIDER NOTE - ATTENDING APP SHARED VISIT CONTRIBUTION OF CARE
I, Carroll Chen MD, Emergency Medicine Attending Physician, personally saw and examined the patient and I personally made/approve the management plan and take responsibility for the patient management.    MDM: 54-year-old female with history of hypertension, hyperlipidemia, who presents with shortness of breath.  Patient with recent diagnosis of right thoracic mass "in between her lungs and heart", and has scheduled resection in June 2024.  Patient started to have symptoms with cough, fever Tmax 103F and bodyaches that worsened and started to have shortness of breath bloody nasal discharge and scant hemoptysis.  Patient also notes pleuritic chest pain, but states this is has been chronic for years and has seen specialists including cardiologist and pulmonologist, and they deemed the etiology due to thoracic mass.  Also reports nonbloody, nonwatery diarrhea but no abdominal pain.    ROS: denies trauma, abdominal pain, nausea, vomiting, diaphoresis, dizziness, syncope, leg pain/swelling, paresthesia, numbness, or weakness.    On examination, patient with tachycardia and borderline febrile but otherwise normal vital signs including oxygenation, no respiratory distress. Cardiac examination with tachycardia, lungs CTAB with no W/W/R.  Abdomen is soft and nontender, nondistended, no peritoneal signs of guarding/rigidity/rebound.  Neurovascularly intact in all 4 extremities.  There is no calf tenderness or leg swelling. Negative Arlette's sign.    Will obtain labs to evaluate for hematologic disorder, metabolic derangements, hepatic and renal function, and screen for infection.  Will keep patient on cardiac monitor, obtain EKG and troponin to evaluate for possible cardiac abnormality including ACS and arrhythmia.  Due to patient's tachycardia, scant hemoptysis, thoracic mass and pleuritic chest pain (though chronic), concern for possible pulm embolism or other intrathoracic pathology.  Pain control, IV fluids and reassess    My independent interpretation of the EKG shows:  Sinus tachycardia with rate of 104 bpm, leftward axis, incomplete RBBB, no ST elevations or depressions.  QTc 460 I, Carroll Chen MD, Emergency Medicine Attending Physician, personally saw and examined the patient and I personally made/approve the management plan and take responsibility for the patient management.    MDM: 54-year-old female with history of hypertension, hyperlipidemia, who presents with shortness of breath.  Patient with recent diagnosis of right thoracic mass "in between her lungs and heart", and has scheduled resection in June 2024.  Patient started to have symptoms with cough, fever Tmax 103F and bodyaches that worsened and started to have shortness of breath bloody nasal discharge and scant hemoptysis.  Patient also notes pleuritic chest pain, but states this is has been chronic for years and has seen specialists including cardiologist and pulmonologist, and they deemed the etiology due to thoracic mass.  Also reports nonbloody, nonwatery diarrhea but no abdominal pain.    ROS: denies trauma, abdominal pain, nausea, vomiting, diaphoresis, dizziness, syncope, leg pain/swelling, paresthesia, numbness, or weakness.    On examination, patient with tachycardia and borderline febrile but otherwise normal vital signs including oxygenation, no respiratory distress. Cardiac examination with tachycardia, lungs CTAB with no W/W/R.  Abdomen is soft and nontender, nondistended, no peritoneal signs of guarding/rigidity/rebound.  Neurovascularly intact in all 4 extremities.  There is no calf tenderness or leg swelling. Negative Arlette's sign.    Will obtain labs to evaluate for hematologic disorder, metabolic derangements, hepatic and renal function, and screen for infection.  Will keep patient on cardiac monitor, obtain EKG and troponin to evaluate for possible cardiac abnormality including ACS and arrhythmia.  Due to patient's tachycardia, scant hemoptysis, thoracic mass and pleuritic chest pain (though chronic), concern for possible pulm embolism or other intrathoracic pathology.  Pain control, IV fluids and reassess    My independent interpretation of the EKG shows:  Sinus tachycardia with rate of 104 bpm, leftward axis, incomplete RBBB, no ST elevations or depressions.  QTc 460    Labs and imaging reviewed. Patient aware of suboptimal CTA, and unable to fully rule out PE. But does have evidence of PNA. Thus will require admission for sepsis, as well as further evaluation of patient's symptoms to see if solely due to sepsis from pneumonia, or non-massive PE, or other etiology. The patient will need to be admitted to the hospital for continued evaluation and management.  Discussed with the accepting physician regarding the initial presentation, diagnostic studies, treatments given in the ED, and current plan of care.   The patient was accepted by and endorsed to the medicine team.

## 2024-05-05 NOTE — ED ADULT TRIAGE NOTE - MODE OF ARRIVAL
Private Auto Walk in SSKI Counseling:  I discussed with the patient the risks of SSKI including but not limited to thyroid abnormalities, metallic taste, GI upset, fever, headache, acne, arthralgias, paraesthesias, lymphadenopathy, easy bleeding, arrhythmias, and allergic reaction.

## 2024-05-05 NOTE — H&P ADULT - NSHPPHYSICALEXAM_GEN_ALL_CORE
PHYSICAL EXAMINATION:  Vital Signs Last 24 Hrs  T(C): 38.6 (05 May 2024 10:10), Max: 38.6 (05 May 2024 10:10)  T(F): 101.4 (05 May 2024 10:10), Max: 101.4 (05 May 2024 10:10)  HR: 92 (05 May 2024 11:30) (92 - 114)  BP: 113/77 (05 May 2024 11:30) (113/77 - 126/77)  BP(mean): --  RR: 16 (05 May 2024 11:30) (16 - 20)  SpO2: 98% (05 May 2024 11:30) (97% - 98%)    Parameters below as of 05 May 2024 11:30  Patient On (Oxygen Delivery Method): room air      CAPILLARY BLOOD GLUCOSE          GENERAL: NAD,   ENMT: mucous membranes moist  NECK: supple, No JVD  CHEST/LUNG: clear to auscultation bilaterally; no rales, rhonchi, or wheezing b/l  HEART: normal S1, S2  ABDOMEN: BS+, soft, ND, NT   EXTREMITIES:  pulses palpable; no clubbing, cyanosis, or edema b/l LEs  NEURO: awake, alert, interactive; moves all extremities PHYSICAL EXAMINATION:  Vital Signs Last 24 Hrs  T(C): 38.6 (05 May 2024 10:10), Max: 38.6 (05 May 2024 10:10)  T(F): 101.4 (05 May 2024 10:10), Max: 101.4 (05 May 2024 10:10)  HR: 92 (05 May 2024 11:30) (92 - 114)  BP: 113/77 (05 May 2024 11:30) (113/77 - 126/77)  BP(mean): --  RR: 16 (05 May 2024 11:30) (16 - 20)  SpO2: 98% (05 May 2024 11:30) (97% - 98%)    Parameters below as of 05 May 2024 11:30  Patient On (Oxygen Delivery Method): room air      CAPILLARY BLOOD GLUCOSE          GENERAL: NAD, seen on 4 DSU, comfortable, eating dinner, no wheeze or SOB at rest.    ENMT: mucous membranes moist  NECK: supple, No JVD  CHEST/LUNG: clear to auscultation bilaterally; no rales, rhonchi, or wheezing b/l  HEART: normal S1, S2  ABDOMEN: BS+, soft, ND, NT   EXTREMITIES:  pulses palpable; no clubbing, cyanosis, or edema b/l LEs  NEURO: awake, alert, interactive; moves all extremities

## 2024-05-05 NOTE — CONSULT NOTE ADULT - SUBJECTIVE AND OBJECTIVE BOX
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 Patient last took anti-pyretics at 2am this morning. Patient reported her sputum is blood tinged and unable to sleep 2/2 to the cough. Patient feels dyspneic with speaking. Has had diarrhea since onset of symptoms up to five times per day. Patient reports chronic chest pain 2/2 to the chest wall mass. Patient denied syncope, abdominal pain, vomiting, urinary symptoms. 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 Patient last took anti-pyretics at 2am this morning. Patient reported her sputum is blood tinged and unable to sleep 2/2 to the cough. Patient feels dyspneic with speaking. Has had diarrhea since onset of symptoms up to five times per day. Patient reports chronic chest pain 2/2 to the chest wall mass. Patient denied syncope, abdominal pain, vomiting, urinary symptoms. 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 1000 milliLiter(s) (75 mL/Hr) IV Continuous <Continuous>    MEDICATIONS  (PRN):      FAMILY HISTORY:      SOCIAL HISTORY:    [x ] Non-smoker  [ ] Smoker  [ ] Alcohol    Allergies    Seafood (Rash)  Angioscein (Short breath)    Intolerances    	    REVIEW OF SYSTEMS:  CONSTITUTIONAL: No fever, weight loss, or fatigue  EYES: No eye pain, visual disturbances, or discharge  ENT:  No difficulty hearing, tinnitus, vertigo; No sinus or throat pain  NECK: No pain or stiffness  RESPIRATORY: No cough, wheezing, chills or hemoptysis; +Shortness of Breath  CARDIOVASCULAR: No chest pain, palpitations, passing out, dizziness, or leg swelling  GASTROINTESTINAL: No abdominal or epigastric pain. No nausea, vomiting, or hematemesis; No diarrhea or constipation. No melena or hematochezia.  GENITOURINARY: No dysuria, frequency, hematuria, or incontinence  NEUROLOGICAL: No headaches, memory loss, loss of strength, numbness, or tremors  SKIN: No itching, burning, rashes, or lesions   LYMPH Nodes: No enlarged glands  ENDOCRINE: No heat or cold intolerance; No hair loss  MUSCULOSKELETAL: No joint pain or swelling; No muscle, back, or extremity pain  PSYCHIATRIC: No depression, anxiety, mood swings, or difficulty sleeping  HEME/LYMPH: No easy bruising, or bleeding gums  ALLERGY AND IMMUNOLOGIC: No hives or eczema	    [ x] All others negative	  [ ] Unable to obtain    PHYSICAL EXAM:  T(C): 37.8 (05-05-24 @ 14:50), Max: 38.6 (05-05-24 @ 10:10)  HR: 86 (05-05-24 @ 14:50) (86 - 114)  BP: 123/78 (05-05-24 @ 14:50) (113/77 - 126/77)  RR: 18 (05-05-24 @ 14:50) (16 - 20)  SpO2: 97% (05-05-24 @ 14:50) (97% - 98%)  Wt(kg): --  I&O's Summary      Appearance: Normal	  HEENT:   Normal oral mucosa, PERRL, EOMI	  Lymphatic: No lymphadenopathy  Cardiovascular: Normal S1 S2, No JVD, + murmurs, No edema  Respiratory: rhonchi  Psychiatry: A & O x 3, Mood & affect appropriate  Gastrointestinal:  Soft, Non-tender, + BS	  Skin: No rashes, No ecchymoses, No cyanosis	  Extremities: Normal range of motion, No clubbing, cyanosis or edema  Vascular: Peripheral pulses palpable 2+ bilaterally    TELEMETRY: 	    ECG:  	  RADIOLOGY:  OTHER: 	  	  LABS:	 	    CARDIAC MARKERS                        14.0   7.95  )-----------( 230      ( 05 May 2024 09:36 )             41.9     05-05    139  |  107  |  8   ----------------------------<  109<H>  4.1   |  21<L>  |  0.85    Ca    9.7      05 May 2024 09:36    TPro  8.1  /  Alb  4.2  /  TBili  0.5  /  DBili  x   /  AST  23  /  ALT  26  /  AlkPhos  86  05-05    proBNP:   Lipid Profile:   HgA1c:   TSH:   PT/INR - ( 05 May 2024 09:36 )   PT: 12.1 sec;   INR: 1.10 ratio         PTT - ( 05 May 2024 09:36 )  PTT:30.3 sec    PREVIOUS DIAGNOSTIC TESTING:       < from: CT Angio Chest PE Protocol w/ IV Cont (05.05.24 @ 11:22) >  Limited evaluation for lobar, segmental and subsegmental pulmonary   embolism. No main, left or right pulmonary embolism.    Tracheobronchial secretions. Scattered patchy opacities.    Anterior mediastinal soft tissue 2.4 x 1.8 cm indeterminate nodule. Left   adrenal indeterminate nodule measures 2.3 cm

## 2024-05-05 NOTE — CONSULT NOTE ADULT - ASSESSMENT
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 Patient last took anti-pyretics at 2am this morning. Patient reported her sputum is blood tinged and unable to sleep 2/2 to the cough. Patient feels dyspneic with speaking. Has had diarrhea since onset of symptoms up to five times per day. Patient reports chronic chest pain 2/2 to the chest wall mass. Patient denied syncope, abdominal pain, vomiting, urinary symptoms. 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 Patient last took anti-pyretics at 2am this morning. Patient reported her sputum is blood tinged and unable to sleep 2/2 to the cough. Patient feels dyspneic with speaking. Has had diarrhea since onset of symptoms up to five times per day. Patient reports chronic chest pain 2/2 to the chest wall mass. Patient denied syncope, abdominal pain, vomiting, urinary symptoms. 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  pt with chest wall mass with hemoptysis ?pneumoniae  will need to review the ct scan  cta no main PE, no mention any abnormality or effect on the heart  agree  with abx for possible pneumoniae, check RVP  echo  le venous doppler r/o DVT  will adjust bp meds

## 2024-05-05 NOTE — ED PROVIDER NOTE - OBJECTIVE STATEMENT
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 Patient last took anti-pyretics at 2am this morning. Patient reported her sputum is blood tinged and unable to sleep 2/2 to the cough. Patient feels dyspneic with speaking. Has had diarrhea since onset of symptoms up to five times per day. Patient reports chronic chest pain 2/2 to the chest wall mass. Patient denied syncope, palpitations, lightheadedness, abdominal pain, nausea, vomiting, urinary symptoms. 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

## 2024-05-05 NOTE — H&P ADULT - HISTORY OF PRESENT ILLNESS
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 Patient last took anti-pyretics at 2am this morning. Patient reported her sputum is blood tinged and unable to sleep 2/2 to the cough. Patient feels dyspneic with speaking. Has had diarrhea since onset of symptoms up to five times per day. Patient reports chronic chest pain 2/2 to the chest wall mass. Patient denied syncope, abdominal pain, vomiting, urinary symptoms. 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 Patient last took anti-pyretics at 2am this morning. Patient reported her sputum is blood tinged and unable to sleep 2/2 to the cough. Patient feels dyspneic with speaking. Has had diarrhea since onset of symptoms up to five times per day. Patient reports chronic chest pain 2/2 to the chest wall mass. Patient denied syncope, abdominal pain, vomiting, urinary symptoms. 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     No history of DM, CAD, no CHF, no prior cardiac stents.

## 2024-05-05 NOTE — H&P ADULT - ASSESSMENT
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 Patient last took anti-pyretics at 2am this morning. Patient reported her sputum is blood tinged and unable to sleep 2/2 to the cough. Patient feels dyspneic with speaking. Has had diarrhea since onset of symptoms up to five times per day. Patient reports chronic chest pain 2/2 to the chest wall mass. Patient denied syncope, abdominal pain, vomiting, urinary symptoms. 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 Patient last took anti-pyretics at 2am this morning. Patient reported her sputum is blood tinged and unable to sleep 2/2 to the cough. Patient feels dyspneic with speaking. Has had diarrhea since onset of symptoms up to five times per day. Patient reports chronic chest pain 2/2 to the chest wall mass. Patient denied syncope, abdominal pain, vomiting, urinary symptoms. 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 CTA no PE, increased secretions and consolidation noted. Will continue IV     Ceftriaxone and Zithromax. Urine for legionella AG sent as well. IVF for now, Mucinex bid for 10 days. Will continue Symbicort bid and Albuterol from home.     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 Patient last took anti-pyretics at 2am this morning. Patient reported her sputum is blood tinged and unable to sleep 2/2 to the cough. Patient feels dyspneic with speaking. Has had diarrhea since onset of symptoms up to five times per day. Patient reports chronic chest pain 2/2 to the chest wall mass. Patient denied syncope, abdominal pain, vomiting, urinary symptoms. 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 CTA no PE, increased secretions and consolidation noted. Will continue IV     Ceftriaxone and Zithromax. Urine for legionella AG sent as well. IVF for now, Mucinex bid for 10 days. Will continue Symbicort bid and Albuterol from home. UA negative for     infection, flu and covid panel sent.     Continue home meds: Topimax, Zoloft, Pepcid and Lipitor. Will hold Norvasc as BP low in ER.       Mediastinal lymph nodes noted on CTA, scheduled for resection in June.   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 Patient last took anti-pyretics at 2am this morning. Patient reported her sputum is blood tinged and unable to sleep 2/2 to the cough. Patient feels dyspneic with speaking. Has had diarrhea since onset of symptoms up to five times per day. Patient reports chronic chest pain 2/2 to the chest wall mass. Patient denied syncope, abdominal pain, vomiting, urinary symptoms. 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 CTA no PE, increased secretions and consolidation noted. Will continue IV     Ceftriaxone and Zithromax. Urine for legionella AG sent as well. IVF started. Added Mucinex bid for 10 days. Will continue Symbicort bid and Albuterol from home. UA negative for     infection, flu and covid panel sent. WBC count is normal.       Continue home meds: Topimax, Zoloft, Pepcid and Lipitor all at home dose. Will hold Norvasc as BP low in ER.       Mediastinal lymph nodes noted on CTA, scheduled for resection in June.

## 2024-05-06 LAB
ANION GAP SERPL CALC-SCNC: 13 MMOL/L — SIGNIFICANT CHANGE UP (ref 5–17)
BUN SERPL-MCNC: 7 MG/DL — SIGNIFICANT CHANGE UP (ref 7–23)
CALCIUM SERPL-MCNC: 8.7 MG/DL — SIGNIFICANT CHANGE UP (ref 8.4–10.5)
CHLORIDE SERPL-SCNC: 112 MMOL/L — HIGH (ref 96–108)
CO2 SERPL-SCNC: 17 MMOL/L — LOW (ref 22–31)
CREAT SERPL-MCNC: 0.64 MG/DL — SIGNIFICANT CHANGE UP (ref 0.5–1.3)
CULTURE RESULTS: SIGNIFICANT CHANGE UP
EGFR: 104 ML/MIN/1.73M2 — SIGNIFICANT CHANGE UP
GLUCOSE SERPL-MCNC: 90 MG/DL — SIGNIFICANT CHANGE UP (ref 70–99)
HCT VFR BLD CALC: 38.3 % — SIGNIFICANT CHANGE UP (ref 34.5–45)
HGB BLD-MCNC: 12.1 G/DL — SIGNIFICANT CHANGE UP (ref 11.5–15.5)
MCHC RBC-ENTMCNC: 28.6 PG — SIGNIFICANT CHANGE UP (ref 27–34)
MCHC RBC-ENTMCNC: 31.6 GM/DL — LOW (ref 32–36)
MCV RBC AUTO: 90.5 FL — SIGNIFICANT CHANGE UP (ref 80–100)
MRSA PCR RESULT.: SIGNIFICANT CHANGE UP
NRBC # BLD: 0 /100 WBCS — SIGNIFICANT CHANGE UP (ref 0–0)
PLATELET # BLD AUTO: 219 K/UL — SIGNIFICANT CHANGE UP (ref 150–400)
POTASSIUM SERPL-MCNC: 3.6 MMOL/L — SIGNIFICANT CHANGE UP (ref 3.5–5.3)
POTASSIUM SERPL-SCNC: 3.6 MMOL/L — SIGNIFICANT CHANGE UP (ref 3.5–5.3)
RBC # BLD: 4.23 M/UL — SIGNIFICANT CHANGE UP (ref 3.8–5.2)
RBC # FLD: 13.5 % — SIGNIFICANT CHANGE UP (ref 10.3–14.5)
S AUREUS DNA NOSE QL NAA+PROBE: SIGNIFICANT CHANGE UP
SODIUM SERPL-SCNC: 142 MMOL/L — SIGNIFICANT CHANGE UP (ref 135–145)
SPECIMEN SOURCE: SIGNIFICANT CHANGE UP
WBC # BLD: 6.28 K/UL — SIGNIFICANT CHANGE UP (ref 3.8–10.5)
WBC # FLD AUTO: 6.28 K/UL — SIGNIFICANT CHANGE UP (ref 3.8–10.5)

## 2024-05-06 RX ORDER — BUDESONIDE AND FORMOTEROL FUMARATE DIHYDRATE 160; 4.5 UG/1; UG/1
2 AEROSOL RESPIRATORY (INHALATION)
Refills: 0 | DISCHARGE

## 2024-05-06 RX ORDER — TOPIRAMATE 25 MG
1 TABLET ORAL
Refills: 0 | DISCHARGE

## 2024-05-06 RX ORDER — ALBUTEROL 90 UG/1
2 AEROSOL, METERED ORAL
Refills: 0 | DISCHARGE

## 2024-05-06 RX ORDER — CHLORHEXIDINE GLUCONATE 213 G/1000ML
1 SOLUTION TOPICAL DAILY
Refills: 0 | Status: DISCONTINUED | OUTPATIENT
Start: 2024-05-06 | End: 2024-05-07

## 2024-05-06 RX ORDER — MELOXICAM 15 MG/1
1 TABLET ORAL
Refills: 0 | DISCHARGE

## 2024-05-06 RX ORDER — FAMOTIDINE 10 MG/ML
1 INJECTION INTRAVENOUS
Refills: 0 | DISCHARGE

## 2024-05-06 RX ADMIN — FAMOTIDINE 20 MILLIGRAM(S): 10 INJECTION INTRAVENOUS at 06:50

## 2024-05-06 RX ADMIN — AZITHROMYCIN 255 MILLIGRAM(S): 500 TABLET, FILM COATED ORAL at 16:59

## 2024-05-06 RX ADMIN — CHLORHEXIDINE GLUCONATE 1 APPLICATION(S): 213 SOLUTION TOPICAL at 11:47

## 2024-05-06 RX ADMIN — BUDESONIDE AND FORMOTEROL FUMARATE DIHYDRATE 2 PUFF(S): 160; 4.5 AEROSOL RESPIRATORY (INHALATION) at 06:49

## 2024-05-06 RX ADMIN — Medication 600 MILLIGRAM(S): at 16:58

## 2024-05-06 RX ADMIN — CEFTRIAXONE 100 MILLIGRAM(S): 500 INJECTION, POWDER, FOR SOLUTION INTRAMUSCULAR; INTRAVENOUS at 16:59

## 2024-05-06 RX ADMIN — SERTRALINE 75 MILLIGRAM(S): 25 TABLET, FILM COATED ORAL at 10:50

## 2024-05-06 RX ADMIN — FAMOTIDINE 20 MILLIGRAM(S): 10 INJECTION INTRAVENOUS at 16:58

## 2024-05-06 RX ADMIN — Medication 650 MILLIGRAM(S): at 16:58

## 2024-05-06 RX ADMIN — ATORVASTATIN CALCIUM 10 MILLIGRAM(S): 80 TABLET, FILM COATED ORAL at 22:12

## 2024-05-06 RX ADMIN — Medication 100 MILLIGRAM(S): at 10:50

## 2024-05-06 RX ADMIN — Medication 75 MILLIGRAM(S): at 16:58

## 2024-05-06 RX ADMIN — Medication 75 MILLIGRAM(S): at 06:50

## 2024-05-06 NOTE — PROGRESS NOTE ADULT - ASSESSMENT
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 Patient last took anti-pyretics at 2am this morning. Patient reported her sputum is blood tinged and unable to sleep 2/2 to the cough. Patient feels dyspneic with speaking. Has had diarrhea since onset of symptoms up to five times per day. Patient reports chronic chest pain 2/2 to the chest wall mass. Patient denied syncope, abdominal pain, vomiting, urinary symptoms. 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 CTA no PE, increased secretions and consolidation noted. Will continue IV     Ceftriaxone and Zithromax. Urine for legionella AG sent as well. IVF started. Added Mucinex bid for 10 days. Will continue Symbicort bid and Albuterol from home. UA negative for     infection, flu and covid panel sent. WBC count is normal.       Continue home meds: Topimax, Zoloft, Pepcid and Lipitor all at home dose. Will hold Norvasc as BP low in ER.       Mediastinal lymph nodes noted on CTA, scheduled for resection in June.   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 Patient last took anti-pyretics at 2am this morning. Patient reported her sputum is blood tinged and unable to sleep 2/2 to the cough. Patient feels dyspneic with speaking. Has had diarrhea since onset of symptoms up to five times per day. Patient reports chronic chest pain 2/2 to the chest wall mass. Patient denied syncope, abdominal pain, vomiting, urinary symptoms. 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 CTA no PE, increased secretions and consolidation noted. Will continue IV     Ceftriaxone and Zithromax. Urine for legionella AG sent as well. IVF started. Added Mucinex bid for 5 days. Will continue Symbicort bid and Albuterol from home. UA negative for     infection, flu and covid panel sent. WBC count is normal.       Continue home meds: Topimax, Zoloft, Pepcid and Lipitor all at home dose. Will hold Norvasc as BP low in ER.       Mediastinal lymph nodes noted on CTA, scheduled for resection in June.      Continue ABX and IVF.

## 2024-05-06 NOTE — PROGRESS NOTE ADULT - SUBJECTIVE AND OBJECTIVE BOX
INTERVAL HPI/OVERNIGHT EVENTS:  Pt seen and examined at bedside.     Allergies/Intolerance: Seafood (Rash)  Angioscein (Short breath)      MEDICATIONS  (STANDING):  albuterol    90 MICROgram(s) HFA Inhaler 2 Puff(s) Inhalation every 6 hours  atorvastatin 10 milliGRAM(s) Oral at bedtime  azithromycin  IVPB 500 milliGRAM(s) IV Intermittent every 24 hours  budesonide 160 MICROgram(s)/formoterol 4.5 MICROgram(s) Inhaler 2 Puff(s) Inhalation two times a day  cefTRIAXone   IVPB 1000 milliGRAM(s) IV Intermittent every 24 hours  famotidine    Tablet 20 milliGRAM(s) Oral two times a day  oseltamivir 75 milliGRAM(s) Oral two times a day  sertraline 75 milliGRAM(s) Oral daily  sodium chloride 0.9%. 1000 milliLiter(s) (100 mL/Hr) IV Continuous <Continuous>  topiramate 100 milliGRAM(s) Oral daily    MEDICATIONS  (PRN):  acetaminophen     Tablet .. 650 milliGRAM(s) Oral every 6 hours PRN Temp greater or equal to 38C (100.4F), Mild Pain (1 - 3)  guaiFENesin Oral Liquid (Sugar-Free) 100 milliGRAM(s) Oral every 6 hours PRN Cough  ketorolac   Injectable 15 milliGRAM(s) IV Push every 6 hours PRN Mild Pain (1 - 3)        ROS: all systems reviewed and wnl      PHYSICAL EXAMINATION:  Vital Signs Last 24 Hrs  T(C): 36.6 (06 May 2024 06:08), Max: 38.5 (05 May 2024 22:39)  T(F): 97.9 (06 May 2024 06:08), Max: 101.3 (05 May 2024 22:39)  HR: 75 (06 May 2024 06:08) (75 - 110)  BP: 105/69 (06 May 2024 06:08) (105/69 - 162/92)  BP(mean): --  RR: 18 (06 May 2024 06:08) (16 - 20)  SpO2: 95% (06 May 2024 06:08) (94% - 98%)    Parameters below as of 06 May 2024 06:08  Patient On (Oxygen Delivery Method): room air      CAPILLARY BLOOD GLUCOSE            GENERAL:   NECK: supple, No JVD  CHEST/LUNG: clear to auscultation bilaterally; no rales, rhonchi, or wheezing b/l  HEART: normal S1, S2  ABDOMEN: BS+, soft, ND, NT   EXTREMITIES:  pulses palpable; no clubbing, cyanosis, or edema b/l LEs  SKIN: no rashes or lesions      LABS:                        12.1   6.28  )-----------( 219      ( 06 May 2024 06:50 )             38.3     05-06    142  |  112<H>  |  7   ----------------------------<  90  3.6   |  17<L>  |  0.64    Ca    8.7      06 May 2024 06:50    TPro  8.1  /  Alb  4.2  /  TBili  0.5  /  DBili  x   /  AST  23  /  ALT  26  /  AlkPhos  86  05-05    PT/INR - ( 05 May 2024 09:36 )   PT: 12.1 sec;   INR: 1.10 ratio         PTT - ( 05 May 2024 09:36 )  PTT:30.3 sec  Urinalysis Basic - ( 06 May 2024 06:50 )    Color: x / Appearance: x / SG: x / pH: x  Gluc: 90 mg/dL / Ketone: x  / Bili: x / Urobili: x   Blood: x / Protein: x / Nitrite: x   Leuk Esterase: x / RBC: x / WBC x   Sq Epi: x / Non Sq Epi: x / Bacteria: x             INTERVAL HPI/OVERNIGHT EVENTS:  Pt seen and examined at bedside.     Allergies/Intolerance: Seafood (Rash)  Angioscein (Short breath)      MEDICATIONS  (STANDING):  albuterol    90 MICROgram(s) HFA Inhaler 2 Puff(s) Inhalation every 6 hours  atorvastatin 10 milliGRAM(s) Oral at bedtime  azithromycin  IVPB 500 milliGRAM(s) IV Intermittent every 24 hours  budesonide 160 MICROgram(s)/formoterol 4.5 MICROgram(s) Inhaler 2 Puff(s) Inhalation two times a day  cefTRIAXone   IVPB 1000 milliGRAM(s) IV Intermittent every 24 hours  famotidine    Tablet 20 milliGRAM(s) Oral two times a day  oseltamivir 75 milliGRAM(s) Oral two times a day  sertraline 75 milliGRAM(s) Oral daily  sodium chloride 0.9%. 1000 milliLiter(s) (100 mL/Hr) IV Continuous <Continuous>  topiramate 100 milliGRAM(s) Oral daily    MEDICATIONS  (PRN):  acetaminophen     Tablet .. 650 milliGRAM(s) Oral every 6 hours PRN Temp greater or equal to 38C (100.4F), Mild Pain (1 - 3)  guaiFENesin Oral Liquid (Sugar-Free) 100 milliGRAM(s) Oral every 6 hours PRN Cough  ketorolac   Injectable 15 milliGRAM(s) IV Push every 6 hours PRN Mild Pain (1 - 3)        ROS: all systems reviewed and wnl      PHYSICAL EXAMINATION:  Vital Signs Last 24 Hrs  T(C): 36.6 (06 May 2024 06:08), Max: 38.5 (05 May 2024 22:39)  T(F): 97.9 (06 May 2024 06:08), Max: 101.3 (05 May 2024 22:39)  HR: 75 (06 May 2024 06:08) (75 - 110)  BP: 105/69 (06 May 2024 06:08) (105/69 - 162/92)  BP(mean): --  RR: 18 (06 May 2024 06:08) (16 - 20)  SpO2: 95% (06 May 2024 06:08) (94% - 98%)    Parameters below as of 06 May 2024 06:08  Patient On (Oxygen Delivery Method): room air      CAPILLARY BLOOD GLUCOSE            GENERAL: stable, less cough, less wheeze, fever yesterday.   NECK: supple, No JVD  CHEST/LUNG: clear to auscultation bilaterally; no rales, rhonchi, or wheezing b/l  HEART: normal S1, S2  ABDOMEN: BS+, soft, ND, NT   EXTREMITIES:  pulses palpable; no clubbing, cyanosis, or edema b/l LEs    LABS:                        12.1   6.28  )-----------( 219      ( 06 May 2024 06:50 )             38.3     05-06    142  |  112<H>  |  7   ----------------------------<  90  3.6   |  17<L>  |  0.64    Ca    8.7      06 May 2024 06:50    TPro  8.1  /  Alb  4.2  /  TBili  0.5  /  DBili  x   /  AST  23  /  ALT  26  /  AlkPhos  86  05-05    PT/INR - ( 05 May 2024 09:36 )   PT: 12.1 sec;   INR: 1.10 ratio         PTT - ( 05 May 2024 09:36 )  PTT:30.3 sec  Urinalysis Basic - ( 06 May 2024 06:50 )    Color: x / Appearance: x / SG: x / pH: x  Gluc: 90 mg/dL / Ketone: x  / Bili: x / Urobili: x   Blood: x / Protein: x / Nitrite: x   Leuk Esterase: x / RBC: x / WBC x   Sq Epi: x / Non Sq Epi: x / Bacteria: x

## 2024-05-06 NOTE — PROGRESS NOTE ADULT - SUBJECTIVE AND OBJECTIVE BOX
Date of Service: 05-06-24 @ 09:07           CARDIOLOGY     PROGRESS  NOTE   ________________________________________________    CHIEF COMPLAINT:Patient is a 54y old  Female who presents with a chief complaint of chest pain (05 May 2024 15:13)  doing better  	  REVIEW OF SYSTEMS:  CONSTITUTIONAL: No fever, weight loss, or fatigue  EYES: No eye pain, visual disturbances, or discharge  ENT:  No difficulty hearing, tinnitus, vertigo; No sinus or throat pain  NECK: No pain or stiffness  RESPIRATORY: No cough, wheezing, chills or hemoptysis; decrfease Shortness of Breath  CARDIOVASCULAR: No chest pain, palpitations, passing out, dizziness, or leg swelling  GASTROINTESTINAL: No abdominal or epigastric pain. No nausea, vomiting, or hematemesis; No diarrhea or constipation. No melena or hematochezia.  GENITOURINARY: No dysuria, frequency, hematuria, or incontinence  NEUROLOGICAL: No headaches, memory loss, loss of strength, numbness, or tremors  SKIN: No itching, burning, rashes, or lesions   LYMPH Nodes: No enlarged glands  ENDOCRINE: No heat or cold intolerance; No hair loss  MUSCULOSKELETAL: No joint pain or swelling; No muscle, back, or extremity pain  PSYCHIATRIC: No depression, anxiety, mood swings, or difficulty sleeping  HEME/LYMPH: No easy bruising, or bleeding gums  ALLERGY AND IMMUNOLOGIC: No hives or eczema	    [ ] All others negative	  [ ] Unable to obtain    PHYSICAL EXAM:  T(C): 36.6 (05-06-24 @ 06:08), Max: 38.6 (05-05-24 @ 10:10)  HR: 75 (05-06-24 @ 06:08) (75 - 110)  BP: 105/69 (05-06-24 @ 06:08) (105/69 - 162/92)  RR: 18 (05-06-24 @ 06:08) (16 - 20)  SpO2: 95% (05-06-24 @ 06:08) (94% - 98%)  Wt(kg): --  I&O's Summary      Appearance: Normal	  HEENT:   Normal oral mucosa, PERRL, EOMI	  Lymphatic: No lymphadenopathy  Cardiovascular: Normal S1 S2, No JVD, + murmurs, No edema  Respiratory:rhonchi  Psychiatry: A & O x 3, Mood & affect appropriate  Gastrointestinal:  Soft, Non-tender, + BS	  Skin: No rashes, No ecchymoses, No cyanosis	  Extremities: Normal range of motion, No clubbing, cyanosis or edema  Vascular: Peripheral pulses palpable 2+ bilaterally    MEDICATIONS  (STANDING):  albuterol    90 MICROgram(s) HFA Inhaler 2 Puff(s) Inhalation every 6 hours  atorvastatin 10 milliGRAM(s) Oral at bedtime  azithromycin  IVPB 500 milliGRAM(s) IV Intermittent every 24 hours  budesonide 160 MICROgram(s)/formoterol 4.5 MICROgram(s) Inhaler 2 Puff(s) Inhalation two times a day  cefTRIAXone   IVPB 1000 milliGRAM(s) IV Intermittent every 24 hours  famotidine    Tablet 20 milliGRAM(s) Oral two times a day  oseltamivir 75 milliGRAM(s) Oral two times a day  sertraline 75 milliGRAM(s) Oral daily  sodium chloride 0.9%. 1000 milliLiter(s) (100 mL/Hr) IV Continuous <Continuous>  topiramate 100 milliGRAM(s) Oral daily      TELEMETRY: 	    ECG:  	  RADIOLOGY:  OTHER: 	  	  LABS:	 	    CARDIAC MARKERS:                                12.1   6.28  )-----------( 219      ( 06 May 2024 06:50 )             38.3     05-06    142  |  112<H>  |  7   ----------------------------<  90  3.6   |  17<L>  |  0.64    Ca    8.7      06 May 2024 06:50    TPro  8.1  /  Alb  4.2  /  TBili  0.5  /  DBili  x   /  AST  23  /  ALT  26  /  AlkPhos  86  05-05    proBNP:   Lipid Profile:   HgA1c:   TSH:   PT/INR - ( 05 May 2024 09:36 )   PT: 12.1 sec;   INR: 1.10 ratio         PTT - ( 05 May 2024 09:36 )  PTT:30.3 sec  < from: CT Angio Chest PE Protocol w/ IV Cont (05.05.24 @ 11:22) >  Limited evaluation for lobar, segmental and subsegmental pulmonary   embolism. No main, left or right pulmonary embolism.    Tracheobronchial secretions. Scattered patchy opacities.    Anterior mediastinal soft tissue 2.4 x 1.8 cm indeterminate nodule. Left   adrenal indeterminate nodule measures 2.3 cm    Flu With COVID-19 By DOMINIQUE (05.05.24 @ 17:50)    SARS-CoV-2 Result: NotDete: This Respiratory Panel uses polymerase chain reaction (PCR) to detect for  influenza A; influenza B; respiratory syncytial virus; and SARS-CoV-2.  This test was validated by [facility] and is in use under the FDA  Emergency Use Authorization (EUA) for clinical labs CLIA-certified to  perform high complexity testing. Test results should be correlated with  clinical presentation, patient history, and epidemiology.   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 Patient last took anti-pyretics at 2am this morning. Patient reported her sputum is blood tinged and unable to sleep 2/2 to the cough. Patient feels dyspneic with speaking. Has had diarrhea since onset of symptoms up to five times per day. Patient reports chronic chest pain 2/2 to the chest wall mass. Patient denied syncope, abdominal pain, vomiting, urinary symptoms. 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 Patient last took anti-pyretics at 2am this morning. Patient reported her sputum is blood tinged and unable to sleep 2/2 to the cough. Patient feels dyspneic with speaking. Has had diarrhea since onset of symptoms up to five times per day. Patient reports chronic chest pain 2/2 to the chest wall mass. Patient denied syncope, abdominal pain, vomiting, urinary symptoms. 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  pt with chest wall mass with hemoptysis ?pneumoniae  will need to review the ct scan  cta no main PE, no mention any abnormality or effect on the heart  agree  with abx for possible pneumoniae, check RVP  echo  le venous doppler r/o DVT  will adjust bp meds  spoke to the pt and  is supposed to be scheduled for surgery at [facility], seen by cardioplasty echo about 1 year ago sec to intermittent chest pain  will speak to her cardiologist  pt wants second opinion, cta noted

## 2024-05-06 NOTE — PHARMACOTHERAPY INTERVENTION NOTE - COMMENTS
Performed medication reconciliation and home medication list updated in prescription writer/ outpatient medication review. Medications verified with patient and pharmacy.     HOME MEDICATIONS  Albuterol (Eqv-ProAir HFA) 90 mcg/inh inhalation aerosol: 2 puff(s) inhaled 4 times a day as needed for  shortness of breath and/or wheezing  ALPRAZolam 0.5 mg oral tablet: 1 tab(s) orally once a day as needed for  anxiety  atorvastatin 10 mg oral tablet: 1 tab(s) orally once a day  chlorthalidone 25 mg oral tablet: 1 tab(s) orally once a day  diclofenac 1.6% topical gel: Apply topically to affected area as needed for pain  famotidine 40 mg oral tablet: 1 tab(s) orally once a day  ibuprofen 800 mg oral tablet: 1 tab(s) orally 4 times a day as needed for pain  oseltamivir 75 mg oral capsule: 1 cap(s) orally once a day  Prempro 0.3 mg-1.5 mg oral tablet: 1 tab(s) orally once a day  sertraline 50 mg oral tablet: 75 milligram(s) orally once a day  Symbicort 160 mcg-4.5 mcg/inh inhalation aerosol: 2 puff(s) inhaled 2 times a day  topiramate 100 mg oral tablet: 1 tab(s) orally once a day  Ubrelvy 50 mg oral tablet: 1 tab(s) orally as a single dose as needed for migraines.  May repeat dose after =2 hours.  Xiidra 5% ophthalmic solution: 1 drop(s) in each eye 2 times a day    Tona Saavedra, PharmD  PGY1 Pharmacy Resident  Available on Teams  Performed medication reconciliation and home medication list updated in prescription writer/ outpatient medication review. Medications verified with patient and pharmacy.     Of note, when compared to latest outpatient rheum note, pt was noted to be on: methocarbamol & atenolol PRN, but patient has no recollection of medication and there is no hx of them being picked up.     HOME MEDICATIONS  Albuterol (Eqv-ProAir HFA) 90 mcg/inh inhalation aerosol: 2 puff(s) inhaled 4 times a day as needed for  shortness of breath and/or wheezing  ALPRAZolam 0.5 mg oral tablet: 1 tab(s) orally once a day as needed for  anxiety  atorvastatin 10 mg oral tablet: 1 tab(s) orally once a day  chlorthalidone 25 mg oral tablet: 1 tab(s) orally once a day  diclofenac 1.6% topical gel: Apply topically to affected area as needed for pain  famotidine 40 mg oral tablet: 1 tab(s) orally once a day  ibuprofen 800 mg oral tablet: 1 tab(s) orally 4 times a day as needed for pain  oseltamivir 75 mg oral capsule: 1 cap(s) orally once a day  Prempro 0.3 mg-1.5 mg oral tablet: 1 tab(s) orally once a day  sertraline 50 mg oral tablet: 75 milligram(s) orally once a day  Symbicort 160 mcg-4.5 mcg/inh inhalation aerosol: 2 puff(s) inhaled 2 times a day  topiramate 100 mg oral tablet: 1 tab(s) orally once a day  Ubrelvy 50 mg oral tablet: 1 tab(s) orally as a single dose as needed for migraines.  May repeat dose after =2 hours.  Xiidra 5% ophthalmic solution: 1 drop(s) in each eye 2 times a day    Tona Saavedra, PharmD  PGY1 Pharmacy Resident  Available on Teams

## 2024-05-07 ENCOUNTER — TRANSCRIPTION ENCOUNTER (OUTPATIENT)
Age: 54
End: 2024-05-07

## 2024-05-07 VITALS
DIASTOLIC BLOOD PRESSURE: 89 MMHG | RESPIRATION RATE: 18 BRPM | SYSTOLIC BLOOD PRESSURE: 149 MMHG | HEART RATE: 75 BPM | TEMPERATURE: 98 F | OXYGEN SATURATION: 96 %

## 2024-05-07 PROCEDURE — 94640 AIRWAY INHALATION TREATMENT: CPT

## 2024-05-07 PROCEDURE — 36415 COLL VENOUS BLD VENIPUNCTURE: CPT

## 2024-05-07 PROCEDURE — 71275 CT ANGIOGRAPHY CHEST: CPT | Mod: MC

## 2024-05-07 PROCEDURE — 80048 BASIC METABOLIC PNL TOTAL CA: CPT

## 2024-05-07 PROCEDURE — 85014 HEMATOCRIT: CPT

## 2024-05-07 PROCEDURE — 96375 TX/PRO/DX INJ NEW DRUG ADDON: CPT

## 2024-05-07 PROCEDURE — 83605 ASSAY OF LACTIC ACID: CPT

## 2024-05-07 PROCEDURE — 80053 COMPREHEN METABOLIC PANEL: CPT

## 2024-05-07 PROCEDURE — 82330 ASSAY OF CALCIUM: CPT

## 2024-05-07 PROCEDURE — 84295 ASSAY OF SERUM SODIUM: CPT

## 2024-05-07 PROCEDURE — 87640 STAPH A DNA AMP PROBE: CPT

## 2024-05-07 PROCEDURE — 85730 THROMBOPLASTIN TIME PARTIAL: CPT

## 2024-05-07 PROCEDURE — 87641 MR-STAPH DNA AMP PROBE: CPT

## 2024-05-07 PROCEDURE — 87040 BLOOD CULTURE FOR BACTERIA: CPT

## 2024-05-07 PROCEDURE — 85018 HEMOGLOBIN: CPT

## 2024-05-07 PROCEDURE — 84132 ASSAY OF SERUM POTASSIUM: CPT

## 2024-05-07 PROCEDURE — 82803 BLOOD GASES ANY COMBINATION: CPT

## 2024-05-07 PROCEDURE — 82435 ASSAY OF BLOOD CHLORIDE: CPT

## 2024-05-07 PROCEDURE — 82947 ASSAY GLUCOSE BLOOD QUANT: CPT

## 2024-05-07 PROCEDURE — 84484 ASSAY OF TROPONIN QUANT: CPT

## 2024-05-07 PROCEDURE — 87086 URINE CULTURE/COLONY COUNT: CPT

## 2024-05-07 PROCEDURE — 81001 URINALYSIS AUTO W/SCOPE: CPT

## 2024-05-07 PROCEDURE — 85027 COMPLETE CBC AUTOMATED: CPT

## 2024-05-07 PROCEDURE — 84702 CHORIONIC GONADOTROPIN TEST: CPT

## 2024-05-07 PROCEDURE — 87637 SARSCOV2&INF A&B&RSV AMP PRB: CPT

## 2024-05-07 PROCEDURE — 83880 ASSAY OF NATRIURETIC PEPTIDE: CPT

## 2024-05-07 PROCEDURE — 99285 EMERGENCY DEPT VISIT HI MDM: CPT

## 2024-05-07 PROCEDURE — 85025 COMPLETE CBC W/AUTO DIFF WBC: CPT

## 2024-05-07 PROCEDURE — 85610 PROTHROMBIN TIME: CPT

## 2024-05-07 PROCEDURE — 96374 THER/PROPH/DIAG INJ IV PUSH: CPT

## 2024-05-07 RX ORDER — CHLORTHALIDONE 50 MG
1 TABLET ORAL
Refills: 0 | DISCHARGE

## 2024-05-07 RX ORDER — ESTROGEN,CON/M-PROGEST ACET 0.625 (14)
1 TABLET ORAL
Refills: 0 | DISCHARGE

## 2024-05-07 RX ORDER — AZITHROMYCIN 500 MG/1
1 TABLET, FILM COATED ORAL
Qty: 5 | Refills: 0
Start: 2024-05-07 | End: 2024-05-11

## 2024-05-07 RX ORDER — SERTRALINE 25 MG/1
75 TABLET, FILM COATED ORAL
Refills: 0 | DISCHARGE

## 2024-05-07 RX ORDER — ALPRAZOLAM 0.25 MG
1 TABLET ORAL
Refills: 0 | DISCHARGE

## 2024-05-07 RX ORDER — ATORVASTATIN CALCIUM 80 MG/1
1 TABLET, FILM COATED ORAL
Qty: 0 | Refills: 0 | DISCHARGE

## 2024-05-07 RX ORDER — HYDROCODONE BITARTRATE AND HOMATROPINE METHYLBROMIDE 5; 1.5 MG/5ML; MG/5ML
5 SOLUTION ORAL
Qty: 75 | Refills: 0
Start: 2024-05-07 | End: 2024-05-11

## 2024-05-07 RX ORDER — DIPHENHYDRAMINE HCL 50 MG
1 CAPSULE ORAL
Qty: 0 | Refills: 0 | DISCHARGE

## 2024-05-07 RX ORDER — ACETAMINOPHEN 500 MG
2 TABLET ORAL
Qty: 0 | Refills: 0 | DISCHARGE
Start: 2024-05-07

## 2024-05-07 RX ORDER — ATORVASTATIN CALCIUM 80 MG/1
1 TABLET, FILM COATED ORAL
Refills: 0 | DISCHARGE

## 2024-05-07 RX ORDER — METHOCARBAMOL 500 MG/1
2 TABLET, FILM COATED ORAL
Qty: 0 | Refills: 0 | DISCHARGE

## 2024-05-07 RX ORDER — TOPIRAMATE 25 MG
1 TABLET ORAL
Qty: 0 | Refills: 0 | DISCHARGE

## 2024-05-07 RX ORDER — DICLOFENAC SODIUM 75 MG/1
1 TABLET, DELAYED RELEASE ORAL
Qty: 0 | Refills: 0 | DISCHARGE

## 2024-05-07 RX ORDER — ATORVASTATIN CALCIUM 80 MG/1
1 TABLET, FILM COATED ORAL
Qty: 0 | Refills: 0 | DISCHARGE
Start: 2024-05-07

## 2024-05-07 RX ORDER — IBUPROFEN 200 MG
1 TABLET ORAL
Qty: 0 | Refills: 0 | DISCHARGE

## 2024-05-07 RX ORDER — IBUPROFEN 200 MG
1 TABLET ORAL
Refills: 0 | DISCHARGE

## 2024-05-07 RX ORDER — TOPIRAMATE 25 MG
1 TABLET ORAL
Qty: 0 | Refills: 0 | DISCHARGE
Start: 2024-05-07

## 2024-05-07 RX ORDER — LIFITEGRAST 50 MG/ML
1 SOLUTION/ DROPS OPHTHALMIC
Refills: 0 | DISCHARGE

## 2024-05-07 RX ORDER — TOPIRAMATE 25 MG
1 TABLET ORAL
Refills: 0 | DISCHARGE

## 2024-05-07 RX ORDER — UBROGEPANT 100 MG/1
0 TABLET ORAL
Refills: 0 | DISCHARGE

## 2024-05-07 RX ORDER — SERTRALINE 25 MG/1
3 TABLET, FILM COATED ORAL
Qty: 0 | Refills: 0 | DISCHARGE
Start: 2024-05-07

## 2024-05-07 RX ORDER — CEFUROXIME AXETIL 250 MG
1 TABLET ORAL
Qty: 5 | Refills: 0
Start: 2024-05-07 | End: 2024-05-11

## 2024-05-07 RX ADMIN — FAMOTIDINE 20 MILLIGRAM(S): 10 INJECTION INTRAVENOUS at 05:28

## 2024-05-07 RX ADMIN — Medication 600 MILLIGRAM(S): at 05:27

## 2024-05-07 RX ADMIN — CHLORHEXIDINE GLUCONATE 1 APPLICATION(S): 213 SOLUTION TOPICAL at 08:51

## 2024-05-07 RX ADMIN — Medication 100 MILLIGRAM(S): at 08:51

## 2024-05-07 RX ADMIN — Medication 75 MILLIGRAM(S): at 05:27

## 2024-05-07 RX ADMIN — Medication 200 MILLIGRAM(S): at 03:17

## 2024-05-07 RX ADMIN — Medication 100 MILLIGRAM(S): at 09:11

## 2024-05-07 NOTE — PROGRESS NOTE ADULT - REASON FOR ADMISSION
Fevers, cough, flem, flu positive

## 2024-05-07 NOTE — DISCHARGE NOTE PROVIDER - HOSPITAL COURSE
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 Patient last took anti-pyretics at 2am this morning. Patient reported her sputum is blood tinged and unable to sleep 2/2 to the cough. Patient feels dyspneic with speaking. Has had diarrhea since onset of symptoms up to five times per day. Patient reports chronic chest pain 2/2 to the chest wall mass. Patient denied syncope, abdominal pain, vomiting, urinary symptoms. 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     No history of DM, CAD, no CHF, no prior cardiac stents.  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 Patient last took anti-pyretics at 2am this morning. Patient reported her sputum is blood tinged and unable to sleep 2/2 to the cough. Patient feels dyspneic with speaking. Has had diarrhea since onset of symptoms up to five times per day. Patient reports chronic chest pain 2/2 to the chest wall mass. Patient denied syncope, abdominal pain, vomiting, urinary symptoms. 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     No history of DM, CAD, no CHF, no prior cardiac stents.  (05 May 2024 14:18)    Hospital Course:    Admitted to medicine. CXR and CT chest support CAP, flu panel here was negative. Completed IV ABX for CAP and   completed Tamiflu for flu, 5 day course. RA sat stable at discharge, discharged to home.        Important Medication Changes and Reason:    Active or Pending Issues Requiring Follow-up:    Advanced Directives:   [ ] Full code  [ ] DNR  [ ] Hospice    Discharge Diagnoses: CAP with recent Flu. Discharged to home.  Outpatient follow-up with CT surgeon  about mediastinal mass, will need to be removed or biopsied. Has surgeon involved.                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 Patient last took anti-pyretics at 2am this morning. Patient reported her sputum is blood tinged and unable to sleep 2/2 to the cough. Patient feels dyspneic with speaking. Has had diarrhea since onset of symptoms up to five times per day. Patient reports chronic chest pain 2/2 to the chest wall mass. Patient denied syncope, abdominal pain, vomiting, urinary symptoms. 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     No history of DM, CAD, no CHF, no prior cardiac stents.  (05 May 2024 14:18)    Hospital Course:    Admitted to medicine. CXR and CT chest support CAP, flu panel here was negative. Completed IV ABX for CAP and   completed Tamiflu for flu, 5 day course. RA sat stable at discharge, discharged to home.        Important Medication Changes and Reason:    Active or Pending Issues Requiring Follow-up:    Advanced Directives:   [ ] Full code  [ ] DNR  [ ] Hospice    Discharge Diagnoses: CAP with recent Flu. Discharged to home.  Outpatient follow-up with CT surgeon  about mediastinal mass, will need to be removed or biopsied. Has surgeon involved.     Addendum: Sepsis was ruled out from admission.

## 2024-05-07 NOTE — DISCHARGE NOTE NURSING/CASE MANAGEMENT/SOCIAL WORK - PATIENT PORTAL LINK FT
You can access the FollowMyHealth Patient Portal offered by [facility] by registering at the following website: [email or web address]. By joining [employer]’s FollowMyHealth portal, you will also be able to view your health information using other applications (apps) compatible with our system.

## 2024-05-07 NOTE — DISCHARGE NOTE PROVIDER - NSDCMRMEDTOKEN_GEN_ALL_CORE_FT
acetaminophen 325 mg oral tablet: 2 tab(s) orally every 6 hours As needed Temp greater or equal to 38C (100.4F), Mild Pain (1 - 3)  Albuterol (Eqv-ProAir HFA) 90 mcg/inh inhalation aerosol: 2 puff(s) inhaled 4 times a day as needed for  shortness of breath and/or wheezing  atorvastatin 10 mg oral tablet: 1 tab(s) orally once a day (at bedtime)  azithromycin 250 mg oral tablet: 1 tab(s) orally once a day  bisoprolol 5 mg oral tablet: 1 tab(s) orally once a day  cefuroxime 500 mg oral tablet: 1 tab(s) orally once a day  famotidine 40 mg oral tablet: 1 tab(s) orally once a day  guaiFENesin 600 mg oral tablet, extended release: 1 tab(s) orally every 12 hours  Hycodan 5 mg-1.5 mg/5 mL oral syrup: 5 milliliter(s) orally every 8 hours as needed for  cough MDD: 15  oseltamivir 75 mg oral capsule: 1 cap(s) orally once a day  sertraline 25 mg oral tablet: 3 tab(s) orally once a day  Symbicort 160 mcg-4.5 mcg/inh inhalation aerosol: 2 puff(s) inhaled 2 times a day  topiramate 100 mg oral tablet: 1 tab(s) orally once a day

## 2024-05-07 NOTE — DISCHARGE NOTE PROVIDER - NSDCFUSCHEDAPPT_GEN_ALL_CORE_FT
[facility]  PULED [address]  Scheduled Appointment: 06/07/2024

## 2024-05-07 NOTE — DISCHARGE NOTE NURSING/CASE MANAGEMENT/SOCIAL WORK - NSDCPEFALRISK_GEN_ALL_CORE
For information on Fall & Injury Prevention, visit: [email or web address] OR  [email or web address] OR  https://www.cdc.gov/steadi/patient.html

## 2024-05-07 NOTE — PROGRESS NOTE ADULT - SUBJECTIVE AND OBJECTIVE BOX
INTERVAL HPI/OVERNIGHT EVENTS:  Pt seen and examined at bedside.     Allergies/Intolerance: Seafood (Rash)  angiotensin II (Anaphylaxis)  Angioscein (Short breath)  Pineapple (Hives)  Shrimp (Anaphylaxis)      MEDICATIONS  (STANDING):  albuterol    90 MICROgram(s) HFA Inhaler 2 Puff(s) Inhalation every 6 hours  atorvastatin 10 milliGRAM(s) Oral at bedtime  azithromycin  IVPB 500 milliGRAM(s) IV Intermittent every 24 hours  budesonide 160 MICROgram(s)/formoterol 4.5 MICROgram(s) Inhaler 2 Puff(s) Inhalation two times a day  cefTRIAXone   IVPB 1000 milliGRAM(s) IV Intermittent every 24 hours  chlorhexidine 2% Cloths 1 Application(s) Topical daily  famotidine    Tablet 20 milliGRAM(s) Oral two times a day  guaiFENesin  milliGRAM(s) Oral every 12 hours  oseltamivir 75 milliGRAM(s) Oral two times a day  sertraline 75 milliGRAM(s) Oral daily  sodium chloride 0.9%. 1000 milliLiter(s) (100 mL/Hr) IV Continuous <Continuous>  topiramate 100 milliGRAM(s) Oral daily    MEDICATIONS  (PRN):  acetaminophen     Tablet .. 650 milliGRAM(s) Oral every 6 hours PRN Temp greater or equal to 38C (100.4F), Mild Pain (1 - 3)  guaiFENesin Oral Liquid (Sugar-Free) 100 milliGRAM(s) Oral every 6 hours PRN Cough  ketorolac   Injectable 15 milliGRAM(s) IV Push every 6 hours PRN Mild Pain (1 - 3)        ROS: all systems reviewed and wnl      PHYSICAL EXAMINATION:  Vital Signs Last 24 Hrs  T(C): 36.8 (07 May 2024 04:33), Max: 36.8 (07 May 2024 04:33)  T(F): 98.3 (07 May 2024 04:33), Max: 98.3 (07 May 2024 04:33)  HR: 76 (07 May 2024 04:33) (70 - 78)  BP: 128/77 (07 May 2024 04:33) (106/70 - 128/77)  BP(mean): --  RR: 18 (07 May 2024 04:33) (18 - 18)  SpO2: 97% (07 May 2024 04:33) (95% - 97%)    Parameters below as of 07 May 2024 04:33  Patient On (Oxygen Delivery Method): room air      CAPILLARY BLOOD GLUCOSE            GENERAL:   NECK: supple, No JVD  CHEST/LUNG: clear to auscultation bilaterally; no rales, rhonchi, or wheezing b/l  HEART: normal S1, S2  ABDOMEN: BS+, soft, ND, NT   EXTREMITIES:  pulses palpable; no clubbing, cyanosis, or edema b/l LEs  SKIN: no rashes or lesions      LABS:                        12.1   6.28  )-----------( 219      ( 06 May 2024 06:50 )             38.3     05-06    142  |  112<H>  |  7   ----------------------------<  90  3.6   |  17<L>  |  0.64    Ca    8.7      06 May 2024 06:50        Urinalysis Basic - ( 06 May 2024 06:50 )    Color: x / Appearance: x / SG: x / pH: x  Gluc: 90 mg/dL / Ketone: x  / Bili: x / Urobili: x   Blood: x / Protein: x / Nitrite: x   Leuk Esterase: x / RBC: x / WBC x   Sq Epi: x / Non Sq Epi: x / Bacteria: x

## 2024-05-07 NOTE — DISCHARGE NOTE PROVIDER - NSDCCPCAREPLAN_GEN_ALL_CORE_FT
PRINCIPAL DISCHARGE DIAGNOSIS  Diagnosis: Pneumonia  Assessment and Plan of Treatment: Pneumonia is a lung infection that can cause a fever, cough, and trouble breathing.  Continue all antibiotics as ordered until complete.  Nutrition is important, eat small frequent meals.  Get lots of rest and drink fluids.  Call your health care provider upon arrival home from hospital and make a follow up appointment for one week.  If your cough worsens, you develop fever greater than 101', you have shaking chills, a fast heartbeat, trouble breathing and/or feel your are breathing much faster than usual, call your healthcare provider.  Make sure you wash your hands frequently.      SECONDARY DISCHARGE DIAGNOSES  Diagnosis: Flu  Assessment and Plan of Treatment: Continue Tamiflu as prescribed

## 2024-05-07 NOTE — PROGRESS NOTE ADULT - SUBJECTIVE AND OBJECTIVE BOX
Date of Service: 05-07-24 @ 08:32           CARDIOLOGY     PROGRESS  NOTE   ________________________________________________    CHIEF COMPLAINT:Patient is a 54y old  Female who presents with a chief complaint of Fevers, cough, flem, flu positive (06 May 2024 10:35)  doing betterr  	  REVIEW OF SYSTEMS:  CONSTITUTIONAL: No fever, weight loss, or fatigue  EYES: No eye pain, visual disturbances, or discharge  ENT:  No difficulty hearing, tinnitus, vertigo; No sinus or throat pain  NECK: No pain or stiffness  RESPIRATORY: No cough, wheezing, chills or hemoptysis; No Shortness of Breath  CARDIOVASCULAR: No chest pain, palpitations, passing out, dizziness, or leg swelling  GASTROINTESTINAL: No abdominal or epigastric pain. No nausea, vomiting, or hematemesis; No diarrhea or constipation. No melena or hematochezia.  GENITOURINARY: No dysuria, frequency, hematuria, or incontinence  NEUROLOGICAL: No headaches, memory loss, loss of strength, numbness, or tremors  SKIN: No itching, burning, rashes, or lesions   LYMPH Nodes: No enlarged glands  ENDOCRINE: No heat or cold intolerance; No hair loss  MUSCULOSKELETAL: No joint pain or swelling; No muscle, back, or extremity pain  PSYCHIATRIC: No depression, anxiety, mood swings, or difficulty sleeping  HEME/LYMPH: No easy bruising, or bleeding gums  ALLERGY AND IMMUNOLOGIC: No hives or eczema	    [x ] All others negative	  [ ] Unable to obtain    PHYSICAL EXAM:  T(C): 36.8 (05-07-24 @ 04:33), Max: 36.8 (05-07-24 @ 04:33)  HR: 76 (05-07-24 @ 04:33) (70 - 78)  BP: 128/77 (05-07-24 @ 04:33) (106/70 - 128/77)  RR: 18 (05-07-24 @ 04:33) (18 - 18)  SpO2: 97% (05-07-24 @ 04:33) (95% - 97%)  Wt(kg): --  I&O's Summary      Appearance: Normal	  HEENT:   Normal oral mucosa, PERRL, EOMI	  Lymphatic: No lymphadenopathy  Cardiovascular: Normal S1 S2, No JVD, + murmurs, No edema  Respiratoryrhonchi  Psychiatry: A & O x 3, Mood & affect appropriate  Gastrointestinal:  Soft, Non-tender, + BS	  Skin: No rashes, No ecchymoses, No cyanosis	  Neurologic: Non-focal  Extremities: Normal range of motion, No clubbing, cyanosis or edema  Vascular: Peripheral pulses palpable 2+ bilaterally    MEDICATIONS  (STANDING):  albuterol    90 MICROgram(s) HFA Inhaler 2 Puff(s) Inhalation every 6 hours  atorvastatin 10 milliGRAM(s) Oral at bedtime  azithromycin  IVPB 500 milliGRAM(s) IV Intermittent every 24 hours  budesonide 160 MICROgram(s)/formoterol 4.5 MICROgram(s) Inhaler 2 Puff(s) Inhalation two times a day  cefTRIAXone   IVPB 1000 milliGRAM(s) IV Intermittent every 24 hours  chlorhexidine 2% Cloths 1 Application(s) Topical daily  famotidine    Tablet 20 milliGRAM(s) Oral two times a day  guaiFENesin  milliGRAM(s) Oral every 12 hours  oseltamivir 75 milliGRAM(s) Oral two times a day  sertraline 75 milliGRAM(s) Oral daily  sodium chloride 0.9%. 1000 milliLiter(s) (100 mL/Hr) IV Continuous <Continuous>  topiramate 100 milliGRAM(s) Oral daily      TELEMETRY: 	    ECG:  	  RADIOLOGY:  OTHER: 	  	  LABS:	 	    CARDIAC MARKERS:                                12.1   6.28  )-----------( 219      ( 06 May 2024 06:50 )             38.3     05-06    142  |  112<H>  |  7   ----------------------------<  90  3.6   |  17<L>  |  0.64    Ca    8.7      06 May 2024 06:50    TPro  8.1  /  Alb  4.2  /  TBili  0.5  /  DBili  x   /  AST  23  /  ALT  26  /  AlkPhos  86  05-05    proBNP:   Lipid Profile:   HgA1c:   TSH:   PT/INR - ( 05 May 2024 09:36 )   PT: 12.1 sec;   INR: 1.10 ratio         PTT - ( 05 May 2024 09:36 )  PTT:30.3 sec        Assessment and plan  ---------------------------  53 y/o female PMH HTN, HLD, recently diagnosed with "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 Patient last took anti-pyretics at 2am this morning. Patient reported her sputum is blood tinged and unable to sleep 2/2 to the cough. Patient feels dyspneic with speaking. Has had diarrhea since onset of symptoms up to five times per day. Patient reports chronic chest pain 2/2 to the chest wall mass. Patient denied syncope, abdominal pain, vomiting, urinary symptoms. 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 Patient last took anti-pyretics at 2am this morning. Patient reported her sputum is blood tinged and unable to sleep 2/2 to the cough. Patient feels dyspneic with speaking. Has had diarrhea since onset of symptoms up to five times per day. Patient reports chronic chest pain 2/2 to the chest wall mass. Patient denied syncope, abdominal pain, vomiting, urinary symptoms. 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  pt with chest wall mass with hemoptysis ?pneumoniae  will need to review the ct scan  cta no main PE, no mention any abnormality or effect on the heart  agree  with abx for possible pneumoniae, check RVP  echo  will adjust bp meds  spoke to the pt and  is supposed to be scheduled for surgery at [facility], seen by cardioplasty echo about 1 year ago sec to intermittent chest pain  will speak to her cardiologist  pt wants second opinion, cta noted, DTR Luis Daniel Anderson called this am  doing better on abx  oob to chair, increase ambulation

## 2024-05-07 NOTE — PROGRESS NOTE ADULT - ASSESSMENT
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 Patient last took anti-pyretics at 2am this morning. Patient reported her sputum is blood tinged and unable to sleep 2/2 to the cough. Patient feels dyspneic with speaking. Has had diarrhea since onset of symptoms up to five times per day. Patient reports chronic chest pain 2/2 to the chest wall mass. Patient denied syncope, abdominal pain, vomiting, urinary symptoms. 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 CTA no PE, increased secretions and consolidation noted. Will continue IV     Ceftriaxone and Zithromax. Urine for legionella AG sent as well. IVF started. Added Mucinex bid for 5 days. Will continue Symbicort bid and Albuterol from home. UA negative for     infection, flu and covid panel sent. WBC count is normal.       Continue home meds: Topimax, Zoloft, Pepcid and Lipitor all at home dose. Will hold Norvasc as BP low in ER.       Mediastinal lymph nodes noted on CTA, scheduled for resection in June.      Continue ABX and IVF.  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 Patient last took anti-pyretics at 2am this morning. Patient reported her sputum is blood tinged and unable to sleep 2/2 to the cough. Patient feels dyspneic with speaking. Has had diarrhea since onset of symptoms up to five times per day. Patient reports chronic chest pain 2/2 to the chest wall mass. Patient denied syncope, abdominal pain, vomiting, urinary symptoms. 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 CTA no PE, increased secretions and consolidation noted. Will continue IV     Ceftriaxone and Zithromax. Urine for legionella AG sent as well. IVF stopped. Added Mucinex bid for 5 days. Will continue Symbicort bid and Albuterol from home. UA negative for     infection, flu and covid panel sent. WBC count is normal.       Continue home meds: Topimax, Zoloft, Pepcid and Lipitor all at home dose. Will hold Norvasc as BP low in ER.       Mediastinal lymph nodes noted on CTA, scheduled for resection in June.      Continue ABX, discharge home later today.

## 2024-05-10 LAB
CULTURE RESULTS: SIGNIFICANT CHANGE UP
CULTURE RESULTS: SIGNIFICANT CHANGE UP
SPECIMEN SOURCE: SIGNIFICANT CHANGE UP
SPECIMEN SOURCE: SIGNIFICANT CHANGE UP

## 2024-05-17 PROBLEM — D49.89 ANTERIOR MEDIASTINAL TUMOR: Status: ACTIVE | Noted: 2024-05-17

## 2024-05-20 ENCOUNTER — APPOINTMENT (OUTPATIENT)
Dept: THORACIC SURGERY | Facility: CLINIC | Age: 54
End: 2024-05-20
Payer: COMMERCIAL

## 2024-05-20 VITALS
HEART RATE: 92 BPM | OXYGEN SATURATION: 98 % | DIASTOLIC BLOOD PRESSURE: 83 MMHG | HEIGHT: 63 IN | RESPIRATION RATE: 17 BRPM | SYSTOLIC BLOOD PRESSURE: 123 MMHG | WEIGHT: 162 LBS | BODY MASS INDEX: 28.7 KG/M2

## 2024-05-20 DIAGNOSIS — Z80.52 FAMILY HISTORY OF MALIGNANT NEOPLASM OF BLADDER: ICD-10-CM

## 2024-05-20 DIAGNOSIS — Z80.41 FAMILY HISTORY OF MALIGNANT NEOPLASM OF OVARY: ICD-10-CM

## 2024-05-20 DIAGNOSIS — Z82.49 FAMILY HISTORY OF ISCHEMIC HEART DISEASE AND OTHER DISEASES OF THE CIRCULATORY SYSTEM: ICD-10-CM

## 2024-05-20 DIAGNOSIS — Z80.8 FAMILY HISTORY OF MALIGNANT NEOPLASM OF OTHER ORGANS OR SYSTEMS: ICD-10-CM

## 2024-05-20 DIAGNOSIS — D49.89 NEOPLASM OF UNSPECIFIED BEHAVIOR OF OTHER SPECIFIED SITES: ICD-10-CM

## 2024-05-20 DIAGNOSIS — Z83.511 FAMILY HISTORY OF GLAUCOMA: ICD-10-CM

## 2024-05-20 PROCEDURE — 99205 OFFICE O/P NEW HI 60 MIN: CPT

## 2024-05-20 RX ORDER — ATORVASTATIN CALCIUM 10 MG/1
10 TABLET, FILM COATED ORAL
Refills: 0 | Status: ACTIVE | COMMUNITY

## 2024-05-20 RX ORDER — SERTRALINE 25 MG/1
TABLET, FILM COATED ORAL
Refills: 0 | Status: ACTIVE | COMMUNITY

## 2024-05-20 RX ORDER — TOPIRAMATE 100 MG/1
100 CAPSULE, EXTENDED RELEASE ORAL
Refills: 0 | Status: ACTIVE | COMMUNITY

## 2024-05-20 RX ORDER — UBROGEPANT 50 MG/1
50 TABLET ORAL
Refills: 0 | Status: ACTIVE | COMMUNITY

## 2024-05-20 RX ORDER — FAMOTIDINE 40 MG/1
40 TABLET, FILM COATED ORAL
Refills: 0 | Status: ACTIVE | COMMUNITY

## 2024-05-20 RX ORDER — AMLODIPINE BESYLATE 5 MG/1
5 TABLET ORAL
Refills: 0 | Status: ACTIVE | COMMUNITY

## 2024-05-20 RX ORDER — ALBUTEROL SULFATE 2.5 MG/.5ML
2.5 SOLUTION RESPIRATORY (INHALATION)
Refills: 0 | Status: ACTIVE | COMMUNITY

## 2024-05-20 NOTE — HISTORY OF PRESENT ILLNESS
[FreeTextEntry1] : Ms. PA RUSH, 54 year old female, never a smoker, w/ hx of HTN, HLD, anxiety, asthma, migraine, and GERD. 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 She presented to the ED with hemoptysis, persistent fever, SOB, and body aches on 05/05/2024. CT chest showed anterior mediastinal nodule (known for 1 year) and adrenal nodule. Completed IV ABX for CAP and completed Tamiflu for flu, 5 day course.   CT angio chest on 05/05/2024: - Anterior mediastinal prevascular soft tissue nodule which measures 2.4 x 1.8 cm. (301-43). - Small hiatal hernia. Left adrenal nodule measures 2.3 cm . Scattered hepatic cysts with the largest measuring 2.5 cm left hepatic lobe. - Limited evaluation for lobar, segmental and subsegmental pulmonary embolism. No main, left or right pulmonary embolism.   Patient is here today for CT surgery consultation, referred by inpatient for 2nd opinion. Reports intermittent dry cough associated with shortness of breath. Denies any fever, chills, or hemoptysis.

## 2024-05-20 NOTE — ASSESSMENT
[FreeTextEntry1] : Ms. PA RUSH, 54 year old female, never a smoker, w/ hx of HTN, HLD, anxiety, asthma, migraine, and GERD. 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 She presented to the ED with hemoptysis, persistent fever, SOB, and body aches on 05/05/2024. CT chest showed anterior mediastinal nodule (known for 1 year) and adrenal nodule. Completed IV ABX for CAP and completed Tamiflu for flu, 5 day course.   I have reviewed the patient's medical records and diagnostic images at time of this office consultation and have made the following recommendation: 1. Imaging reviewed with, revealing anterior mediastinal nodule, likely causing her symptoms of shortness of breath. Surgical approach reviewed with patient. Discussed risks in details, patient is agreeable to proceed. Will schedule for Right VATS, possible Left VATS, robotic assisted, thymectomy. 2. Will order for MRI C-spine with IV Contrast for further evaluation.  3. She will need cardiac clearance and PFTs prior to surgery. 4. Will refer to Dr. Braun (pul) to establish care. 5. Reports choking like symptoms, needs to see ENT prior to surgery, will refer to Dr. Barone. 6. Confirmed with patient she is not on anticoagulant therapy.   Recommendations reviewed with patient during this office visit, and all questions answered; Patient instructed on the importance of follow up and verbalizes understanding.    I, VINCENZO Aj, personally performed the evaluation and management (E/M) services for this new patient.  That E/M includes conducting the initial examination, assessing all conditions, and establishing the plan of care.  Today, my ACP, Edgar Petit NP, was here to observe my evaluation and management services for this patient to be followed going forward.

## 2024-05-20 NOTE — PHYSICAL EXAM
[Fully active, able to carry on all pre-disease performance without restriction] : Status 0 - Fully active, able to carry on all pre-disease performance without restriction [General Appearance - Alert] : alert [General Appearance - In No Acute Distress] : in no acute distress [Sclera] : the sclera and conjunctiva were normal [PERRL With Normal Accommodation] : pupils were equal in size, round, and reactive to light [Extraocular Movements] : extraocular movements were intact [Outer Ear] : the ears and nose were normal in appearance [Oropharynx] : the oropharynx was normal [Neck Appearance] : the appearance of the neck was normal [Neck Cervical Mass (___cm)] : no neck mass was observed [Jugular Venous Distention Increased] : there was no jugular-venous distention [Thyroid Diffuse Enlargement] : the thyroid was not enlarged [Thyroid Nodule] : there were no palpable thyroid nodules [Auscultation Breath Sounds / Voice Sounds] : lungs were clear to auscultation bilaterally [Heart Rate And Rhythm] : heart rate was normal and rhythm regular [Heart Sounds] : normal S1 and S2 [Heart Sounds Gallop] : no gallops [Murmurs] : no murmurs [Heart Sounds Pericardial Friction Rub] : no pericardial rub [Examination Of The Chest] : the chest was normal in appearance [Chest Visual Inspection Thoracic Asymmetry] : no chest asymmetry [Diminished Respiratory Excursion] : normal chest expansion [2+] : left 2+ [No Abnormalities] : the abdominal aorta was not enlarged and no bruit was heard [Breast Appearance] : normal in appearance [Breast Palpation Mass] : no palpable masses [Bowel Sounds] : normal bowel sounds [Abdomen Soft] : soft [Abdomen Tenderness] : non-tender [Abdomen Mass (___ Cm)] : no abdominal mass palpated [Cervical Lymph Nodes Enlarged Posterior Bilaterally] : posterior cervical [Cervical Lymph Nodes Enlarged Anterior Bilaterally] : anterior cervical [Supraclavicular Lymph Nodes Enlarged Bilaterally] : supraclavicular [Axillary Lymph Nodes Enlarged Bilaterally] : axillary [Inguinal Lymph Nodes Enlarged Bilaterally] : inguinal [Femoral Lymph Nodes Enlarged Bilaterally] : femoral [No CVA Tenderness] : no ~M costovertebral angle tenderness [No Spinal Tenderness] : no spinal tenderness [Nail Clubbing] : no clubbing  or cyanosis of the fingernails [Abnormal Walk] : normal gait [Musculoskeletal - Swelling] : no joint swelling seen [Motor Tone] : muscle strength and tone were normal [Skin Color & Pigmentation] : normal skin color and pigmentation [Skin Turgor] : normal skin turgor [] : no rash [Deep Tendon Reflexes (DTR)] : deep tendon reflexes were 2+ and symmetric [Sensation] : the sensory exam was normal to light touch and pinprick [No Focal Deficits] : no focal deficits [Oriented To Time, Place, And Person] : oriented to person, place, and time [Impaired Insight] : insight and judgment were intact [Affect] : the affect was normal [Right Carotid Bruit] : no bruit heard over the right carotid [Left Carotid Bruit] : no bruit heard over the left carotid [Right Femoral Bruit] : no bruit heard over the right femoral artery [Left Femoral Bruit] : no bruit heard over the left femoral artery [FreeTextEntry1] : deferred

## 2024-05-22 ENCOUNTER — APPOINTMENT (OUTPATIENT)
Dept: OTOLARYNGOLOGY | Facility: CLINIC | Age: 54
End: 2024-05-22
Payer: COMMERCIAL

## 2024-05-22 VITALS
DIASTOLIC BLOOD PRESSURE: 87 MMHG | WEIGHT: 162 LBS | OXYGEN SATURATION: 97 % | SYSTOLIC BLOOD PRESSURE: 125 MMHG | BODY MASS INDEX: 28.7 KG/M2 | HEART RATE: 85 BPM | HEIGHT: 63 IN

## 2024-05-22 DIAGNOSIS — K21.9 GASTRO-ESOPHAGEAL REFLUX DISEASE W/OUT ESOPHAGITIS: ICD-10-CM

## 2024-05-22 DIAGNOSIS — H60.501 UNSPECIFIED ACUTE NONINFECTIVE OTITIS EXTERNA, RIGHT EAR: ICD-10-CM

## 2024-05-22 DIAGNOSIS — J03.90 ACUTE TONSILLITIS, UNSPECIFIED: ICD-10-CM

## 2024-05-22 PROCEDURE — 31579 LARYNGOSCOPY TELESCOPIC: CPT

## 2024-05-22 PROCEDURE — 99204 OFFICE O/P NEW MOD 45 MIN: CPT | Mod: 25

## 2024-05-22 RX ORDER — LIFITEGRAST 50 MG/ML
5 SOLUTION/ DROPS OPHTHALMIC
Refills: 0 | Status: ACTIVE | COMMUNITY

## 2024-05-22 RX ORDER — FLUTICASONE PROPIONATE AND SALMETEROL 50; 250 UG/1; UG/1
250-50 POWDER RESPIRATORY (INHALATION)
Refills: 0 | Status: ACTIVE | COMMUNITY

## 2024-05-22 RX ORDER — OFLOXACIN OTIC 3 MG/ML
0.3 SOLUTION AURICULAR (OTIC) TWICE DAILY
Qty: 1 | Refills: 0 | Status: ACTIVE | COMMUNITY
Start: 2024-05-22 | End: 1900-01-01

## 2024-05-22 RX ORDER — ALPRAZOLAM 0.5 MG/1
0.5 TABLET ORAL
Refills: 0 | Status: ACTIVE | COMMUNITY

## 2024-05-22 RX ORDER — DICLOFENAC SODIUM 3 G/100G
3 GEL TOPICAL
Refills: 0 | Status: ACTIVE | COMMUNITY

## 2024-05-22 RX ORDER — FAMOTIDINE 40 MG/1
40 TABLET, FILM COATED ORAL TWICE DAILY
Qty: 180 | Refills: 1 | Status: ACTIVE | COMMUNITY
Start: 2024-05-22 | End: 1900-01-01

## 2024-05-22 RX ORDER — ACETIC ACID 20 MG/ML
2 SOLUTION AURICULAR (OTIC)
Refills: 0 | Status: ACTIVE | COMMUNITY

## 2024-05-22 RX ORDER — ASCORBIC ACID 500 MG
TABLET ORAL
Refills: 0 | Status: ACTIVE | COMMUNITY

## 2024-05-22 RX ORDER — MULTIVIT-MIN/FERROUS GLUCONATE 9 MG/15 ML
LIQUID (ML) ORAL
Refills: 0 | Status: ACTIVE | COMMUNITY

## 2024-05-22 RX ORDER — ESTRADIOL AND LEVONORGESTREL .045; .015 MG/D; MG/D
0.045-0.015 PATCH TRANSDERMAL
Refills: 0 | Status: ACTIVE | COMMUNITY

## 2024-05-22 RX ORDER — MV-MIN/FOLIC/VIT K/LYCOP/COQ10 200-100MCG
CAPSULE ORAL
Refills: 0 | Status: ACTIVE | COMMUNITY

## 2024-05-22 RX ORDER — AMOXICILLIN AND CLAVULANATE POTASSIUM 875; 125 MG/1; MG/1
875-125 TABLET, COATED ORAL
Qty: 20 | Refills: 0 | Status: ACTIVE | COMMUNITY
Start: 2024-05-22 | End: 1900-01-01

## 2024-05-22 RX ORDER — ATENOLOL 50 MG/1
TABLET ORAL DAILY
Refills: 0 | Status: COMPLETED | COMMUNITY
End: 2024-05-22

## 2024-05-22 RX ORDER — BUDESONIDE AND FORMOTEROL FUMARATE DIHYDRATE 160; 4.5 UG/1; UG/1
160-4.5 AEROSOL RESPIRATORY (INHALATION)
Refills: 0 | Status: COMPLETED | COMMUNITY
End: 2024-05-22

## 2024-05-22 NOTE — CONSULT LETTER
[Dear  ___] : Dear  [unfilled], [Consult Letter:] : I had the pleasure of evaluating your patient, [unfilled]. [Please see my note below.] : Please see my note below. [Consult Closing:] : Thank you very much for allowing me to participate in the care of this patient.  If you have any questions, please do not hesitate to contact me. [Sincerely,] : Sincerely, [FreeTextEntry2] : Dr. Luis Daniel Anderson [FreeTextEntry3] : Rudi Barone MD, PhD Chief, Division of Laryngology Department of Otolaryngology [facility] Pediatric Otolaryngology, [facility]  of Otolaryngology [facility] [employer] [facility]

## 2024-05-22 NOTE — PHYSICAL EXAM
[] : septum deviated to the right [Midline] : trachea located in midline position [Normal] : no rashes [de-identified] : tenderness to palpation bilateral TMJ [de-identified] : AD injected [de-identified] : lingual tonsilitis

## 2024-05-22 NOTE — HISTORY OF PRESENT ILLNESS
[de-identified] : 54 year old woman referred by Dr. Luis Daniel Anderson ([facility]) for dysphagia and right otalgia. PMH: HTN, HLD, anxiety, asthma, migraines, and GERD.  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 Avoided surgery at first because OSH doctor just wanted to monitor. She presented to the [facility] ED with hemoptysis, persistent fever, SOB, and body aches on 05/05/2024. CT chest 5/5/2024 showed anterior mediastinal nodule (known for 1 year) and adrenal nodule. Completed IV ABX for Pneumonia and completed Tamiflu for flu(5 day course). Reports chest pain where the mass is Breathing back to patient's baseline but still has occasional hemoptysis. Reports she currently has dysphagia with saliva that causes her to feel like she is choking. Reports globus sensation. Never chokes with food, but has some issues with water. Usually tries to drink through straw. States her throat feels very irritated when she swallows, frequent globus sensation "tennis ball", frequent cough and intermittent vocal hoarseness that started about 6 months ago Reports right otalgia and feeling like her right ear is "wet" but nothing is draining out of it--both started about 2 years ago. Denies recent fevers or infections, tinnitus, hearing loss, dizziness, vertigo, headaches related to hearing.  Has been seen by other ENTs, who suggested pain may be maxillofacial problem H/o sleep apnea, was using mouth guard from dentist but it cause problems with TMJ Has been having severe throat pain recently

## 2024-05-23 ENCOUNTER — APPOINTMENT (OUTPATIENT)
Dept: OTOLARYNGOLOGY | Facility: CLINIC | Age: 54
End: 2024-05-23

## 2024-06-07 ENCOUNTER — APPOINTMENT (OUTPATIENT)
Dept: PULMONOLOGY | Facility: CLINIC | Age: 54
End: 2024-06-07
Payer: COMMERCIAL

## 2024-06-07 VITALS
HEIGHT: 63 IN | RESPIRATION RATE: 17 BRPM | DIASTOLIC BLOOD PRESSURE: 86 MMHG | SYSTOLIC BLOOD PRESSURE: 137 MMHG | TEMPERATURE: 98.1 F | WEIGHT: 162 LBS | BODY MASS INDEX: 28.7 KG/M2 | OXYGEN SATURATION: 98 % | HEART RATE: 69 BPM

## 2024-06-07 DIAGNOSIS — R06.09 OTHER FORMS OF DYSPNEA: ICD-10-CM

## 2024-06-07 DIAGNOSIS — J45.909 UNSPECIFIED ASTHMA, UNCOMPLICATED: ICD-10-CM

## 2024-06-07 DIAGNOSIS — E27.8 OTHER SPECIFIED DISORDERS OF ADRENAL GLAND: ICD-10-CM

## 2024-06-07 PROCEDURE — 99203 OFFICE O/P NEW LOW 30 MIN: CPT

## 2024-06-07 RX ORDER — ALBUTEROL SULFATE 90 UG/1
108 (90 BASE) INHALANT RESPIRATORY (INHALATION)
Qty: 1 | Refills: 3 | Status: ACTIVE | COMMUNITY
Start: 2024-06-07 | End: 1900-01-01

## 2024-06-07 RX ORDER — MONTELUKAST 10 MG/1
10 TABLET, FILM COATED ORAL
Qty: 1 | Refills: 2 | Status: ACTIVE | COMMUNITY
Start: 2024-06-07 | End: 1900-01-01

## 2024-06-07 NOTE — PHYSICAL EXAM
[No Acute Distress] : no acute distress [Well Nourished] : well nourished [Well Developed] : well developed [Normal Appearance] : normal appearance [Supple] : supple [No Neck Mass] : no neck mass [No JVD] : no jvd [Normal Rate/Rhythm] : normal rate/rhythm [Normal S1, S2] : normal s1, s2 [No Murmurs] : no murmurs

## 2024-06-10 PROBLEM — E27.8 ADRENAL NODULE: Status: ACTIVE | Noted: 2024-05-17

## 2024-06-10 PROBLEM — R06.09 DYSPNEA ON EXERTION: Status: ACTIVE | Noted: 2017-05-23

## 2024-06-10 NOTE — DISCUSSION/SUMMARY
[FreeTextEntry1] : 54 year old woman with  HTN, HLD, anxiety, asthma, migraine, and GERD. 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  She has not been a smoker  Lungs clear on exam   PLAN  Use ICS LABA Symbicort 160/4.5 2 puffs twice per day.  albuterol MDI as needed   may continue montelukast  Obtain PFT when able  no contraindication to surgery  continue to monitor adrenal nodules  Total time spent : 40 minutes Including: Preparation prior to visit - Reviewing prior record, results of tests and Consultation Reports as applicable Conducting an appropriate H & P during today's encounter  reviewing all available CT chest exams.  demonstrating images to pt as appropriate Counseling patient  Note completion  med renewal discussing differential diagnosis  Mahesh Braun MD

## 2024-06-10 NOTE — HISTORY OF PRESENT ILLNESS
[Never] : never [TextBox_4] : 54 year old patient presents for evaluation of pulmonary status.  She has been evaluated for an anterior mediastinal mass that may be a thymic cyst or thymoma  CT chest 5/5/2024 showed anterior mediastinal nodule (known for 1 year) and adrenal nodule.   CT angio chest on 05/05/2024: - Anterior mediastinal prevascular soft tissue nodule which measures 2.4 x 1.8 cm. (301-43). - Small hiatal hernia. Left adrenal nodule measures 2.3 cm . Scattered hepatic cysts with the largest measuring 2.5 cm left hepatic lobe. - Limited evaluation for lobar, segmental and subsegmental pulmonary embolism. No main, left or right pulmonary embolism.    She relates that she developed COVID-19 infection in 2021 and has had breathing issues since then.  She saw a pulmonologist who diagnosed airways inflammation, treated with ICS LABA and montelukast.  She has had environmental allergies followed by allergist and taking OTC meds.  Allergic to shellfish, dust mite, trees, some fruit.  She had reaction to COVID-19 infection and developed rash , fever, headache that resolved.  The anterior mediastinal mass was diagnosed by her cardiologist when she presented for chest pain and obtained cardiac CT for coronary calcium  at [facility]  Currently she uses ICS LABA , montelukast and albuterol MDI    Primary doctor is  Sachin Hoffmann      PSH:  meningioma, shoulder surgery       PMH:    HTN meningioma    migraine  Angioedema  to ACE I  GERD   SH:   never smoker     ETOH:  none     Occupation: patient care at [employer]   No exposure to chemicals, dust, asbestos, mold     ALLERGY:   NKDA   allergic to ACE I   dyspnea   environmental/seasonal allergy:  as above       Review of Systems:   no sinusitis, sinus infections, nasal obstruction no dysphagia no dry mouth    no lung cancer   no CAD no MI no chest pain no murmur no CHF no HTN no edema    no abdominal pain no liver disease   no Diabetes no thyroid disease no hyperlipidemia     no bleeding   no DVT or PE   no kidney disease   no stroke no seizure

## 2024-06-18 ENCOUNTER — NON-APPOINTMENT (OUTPATIENT)
Age: 54
End: 2024-06-18

## 2024-06-18 RX ORDER — BUDESONIDE AND FORMOTEROL FUMARATE DIHYDRATE 160; 4.5 UG/1; UG/1
160-4.5 AEROSOL RESPIRATORY (INHALATION) TWICE DAILY
Qty: 1 | Refills: 3 | Status: ACTIVE | COMMUNITY
Start: 2024-06-07 | End: 1900-01-01

## 2024-06-28 ENCOUNTER — NON-APPOINTMENT (OUTPATIENT)
Age: 54
End: 2024-06-28

## 2024-07-05 RX ORDER — FLUTICASONE FUROATE AND VILANTEROL TRIFENATATE 100; 25 UG/1; UG/1
100-25 POWDER RESPIRATORY (INHALATION)
Qty: 3 | Refills: 1 | Status: ACTIVE | COMMUNITY
Start: 2024-07-05 | End: 1900-01-01

## 2024-09-13 ENCOUNTER — APPOINTMENT (OUTPATIENT)
Dept: PULMONOLOGY | Facility: CLINIC | Age: 54
End: 2024-09-13

## 2024-10-23 ENCOUNTER — APPOINTMENT (OUTPATIENT)
Dept: OTOLARYNGOLOGY | Facility: CLINIC | Age: 54
End: 2024-10-23

## 2024-11-28 ENCOUNTER — RX RENEWAL (OUTPATIENT)
Age: 54
End: 2024-11-28

## 2025-01-15 ENCOUNTER — APPOINTMENT (OUTPATIENT)
Dept: OTOLARYNGOLOGY | Facility: CLINIC | Age: 55
End: 2025-01-15

## 2025-05-13 ENCOUNTER — RX RENEWAL (OUTPATIENT)
Age: 55
End: 2025-05-13